# Patient Record
Sex: FEMALE | Race: WHITE | NOT HISPANIC OR LATINO | Employment: UNEMPLOYED | URBAN - METROPOLITAN AREA
[De-identification: names, ages, dates, MRNs, and addresses within clinical notes are randomized per-mention and may not be internally consistent; named-entity substitution may affect disease eponyms.]

---

## 2018-01-13 NOTE — PROCEDURES
Results/Data    Procedure: Electromyogram and Nerve Conduction Study  Indication: Right Upper Extremity   Referred by Dr Vida Ruiz  The procedure's were discussed with the patient  Written consent was obtained prior to the procedure and is detailed in the patient's record  Prior to the start of the procedure a time out was taken and the identity of the patient was confirmed via name and date of birth with the patient  The correct site and the procedure to be performed were confirmed  The correct side was confirmed if applicable  The positioning of the patient was verified  The availability of the correct equipment was verified  Procedure Start Time: 8:30 am    Technique: A sterile concentric needle electrode was used  The patient tolerated the procedure well  There were no complications  Results  : Motor and sensory nerve conduction studies were performed on the median and ulnar nerves  The median motor terminal latency was prolonged with a low compound motor action potential amplitude and a slow conduction velocity across the wrist  The ulnar compound motor action potential was within normal limits  The median F-wave latency was prolonged  The ulnar F wave latency was within normal limits  The median sensory peak latency was prolonged with a normal sensory action potential amplitude  The ulnar sensory action potential was within normal limits  The median palmar evoked response was prolonged by 2 0 ms as compared to the ulnar palmar evoked response at the same distance  Concentric needle examination was performed on various proximal and distal muscles including deltoid, biceps, triceps, pronator teres, APB, FDI and low cervical paraspinals  There was no evidence of active denervation in any of the muscles tested  Moderate decreased recruitment of giant motor units was noted in the APB   The compound motor unit action potentials were of normal configuration with interference patterns being full or full for effort in the remaining muscles tested  Interpretation: There is electrophysiologic evidence of a:  1  Moderate median nerve compression neuropathy at the wrist with demyelinative and axonal changes , consistent with a diagnosis of carpal tunnel syndrome  2  There is no evidence of a cervical radiculopathy or ulnar neuropathy  Clinical correlation is recommended        Signatures   Electronically signed by : Farhana Melendez MD; Feb 1 2016  9:30AM EST                       (Author)

## 2018-07-18 DIAGNOSIS — K21.9 CHRONIC GERD: Primary | ICD-10-CM

## 2018-07-18 NOTE — TELEPHONE ENCOUNTER
Pt hasn't been seen since 6/2/16 would you like to do 1 month supply and have me call them in for an ov visit? Please advise thank you!

## 2018-07-23 ENCOUNTER — TELEPHONE (OUTPATIENT)
Dept: GASTROENTEROLOGY | Facility: CLINIC | Age: 58
End: 2018-07-23

## 2018-07-23 RX ORDER — MOMETASONE FUROATE 50 UG/1
2 SPRAY, METERED NASAL
COMMUNITY

## 2018-07-23 RX ORDER — LEVOTHYROXINE SODIUM 75 UG/1
75 CAPSULE ORAL
COMMUNITY
Start: 2018-06-13

## 2018-07-23 RX ORDER — ANTACID TABLETS 500 MG/1
1 TABLET, CHEWABLE ORAL
COMMUNITY

## 2018-07-23 RX ORDER — ATORVASTATIN CALCIUM 20 MG/1
20 TABLET, FILM COATED ORAL
COMMUNITY
Start: 2018-02-08 | End: 2020-01-15

## 2018-07-23 RX ORDER — ALBUTEROL SULFATE 90 UG/1
1 AEROSOL, METERED RESPIRATORY (INHALATION) EVERY 6 HOURS
COMMUNITY
Start: 2018-02-22 | End: 2019-02-22

## 2018-07-23 RX ORDER — ESOMEPRAZOLE MAGNESIUM 40 MG/1
40 CAPSULE, DELAYED RELEASE ORAL DAILY
Qty: 30 CAPSULE | Refills: 2 | Status: SHIPPED | OUTPATIENT
Start: 2018-07-23 | End: 2018-07-25 | Stop reason: SDUPTHER

## 2018-07-23 RX ORDER — ESOMEPRAZOLE MAGNESIUM 40 MG/1
1 CAPSULE, DELAYED RELEASE ORAL DAILY
COMMUNITY
End: 2018-07-23 | Stop reason: SDUPTHER

## 2018-07-25 ENCOUNTER — OFFICE VISIT (OUTPATIENT)
Dept: GASTROENTEROLOGY | Facility: CLINIC | Age: 58
End: 2018-07-25
Payer: COMMERCIAL

## 2018-07-25 VITALS
SYSTOLIC BLOOD PRESSURE: 128 MMHG | BODY MASS INDEX: 32.95 KG/M2 | HEART RATE: 72 BPM | DIASTOLIC BLOOD PRESSURE: 90 MMHG | WEIGHT: 213.5 LBS

## 2018-07-25 DIAGNOSIS — Z12.11 SCREENING FOR COLON CANCER: ICD-10-CM

## 2018-07-25 DIAGNOSIS — K21.9 CHRONIC GERD: ICD-10-CM

## 2018-07-25 DIAGNOSIS — Z09 FOLLOW UP: Primary | ICD-10-CM

## 2018-07-25 PROBLEM — K22.70 BARRETT'S ESOPHAGUS: Status: ACTIVE | Noted: 2018-07-25

## 2018-07-25 PROCEDURE — 99214 OFFICE O/P EST MOD 30 MIN: CPT | Performed by: PHYSICIAN ASSISTANT

## 2018-07-25 RX ORDER — ESOMEPRAZOLE MAGNESIUM 40 MG/1
40 CAPSULE, DELAYED RELEASE ORAL DAILY
Qty: 90 CAPSULE | Refills: 1 | Status: SHIPPED | OUTPATIENT
Start: 2018-07-25 | End: 2018-12-31 | Stop reason: SDUPTHER

## 2018-07-25 NOTE — PROGRESS NOTES
[facility]  Erick Maggie 66 y o  female MRN: [id number]       CC:  Medication refill, history of Rehman's esophagus    HPI:  Jimmy Livingston is a 51-year-old female with history of GERD and Rehman's esophagus  Patient has chronically been on Nexium 40 mg daily  Patient is here to follow-up as she has not been seen since 2016  Patient reports that she has tried weaning herself off of her PPI, and she is usually interested in holistic remedies such as apple cider vinegar  Patient reports that her reflux is generally controlled on Nexium once a day  She is compliant on a GERD diet  She denies unintentional weight loss, odynophagia, dysphagia, melena or hematochezia  Her last EGD was in 2016, which was normal  Patient reports to me that she had a colonoscopy at age 48  To her knowledge was normal, no history of polyps  Review of Systems:    CONSTITUTIONAL: Denies any fever, chills, or rigors  Good appetite, and no recent weight loss  HEENT: No earache or tinnitus  Denies hearing loss or visual disturbances  CARDIOVASCULAR: No chest pain or palpitations  RESPIRATORY: Denies any cough, hemoptysis, shortness of breath or dyspnea on exertion  GASTROINTESTINAL: As noted in the History of Present Illness  GENITOURINARY: No problems with urination  Denies any hematuria or dysuria  NEUROLOGIC: No dizziness or vertigo, denies headaches  MUSCULOSKELETAL: Denies any muscle or joint pain  SKIN: Denies skin rashes or itching  ENDOCRINE: Denies excessive thirst  Denies intolerance to heat or cold  PSYCHOSOCIAL: Denies depression or anxiety  Denies any recent memory loss         Current Outpatient Prescriptions   Medication Sig Dispense Refill    albuterol (PROVENTIL HFA,VENTOLIN HFA) 90 mcg/act inhaler Inhale 1 puff every 6 (six) hours      atorvastatin (LIPITOR) 20 mg tablet Take 20 mg by mouth      Calcium Carbonate 500 MG CHEW 1 tablet      esomeprazole (NexIUM) 40 MG capsule Take 1 capsule (40 mg total) by mouth daily 90 capsule 1    FERROUS SULFATE DRIED PO 1 tablet      levothyroxine 50 mcg tablet Take 50 mcg by mouth      mometasone (NASONEX) 50 mcg/act nasal spray 2 sprays into each nostril      Cholecalciferol 1000 units capsule 1 tablet       No current facility-administered medications for this visit  Past Medical History:   Diagnosis Date    Diabetes mellitus ([address] )     GERD (gastroesophageal reflux disease)     Hyperlipidemia     Hypothyroidism      Past Surgical History:   Procedure Laterality Date    CHOLECYSTECTOMY      HAND SURGERY      TONSILLECTOMY       Social History     Social History    Marital status: /Civil Union     Spouse name: N/A    Number of children: N/A    Years of education: N/A     Social History Main Topics    Smoking status: Former Smoker    Smokeless tobacco: Never Used    Alcohol use No    Drug use: No    Sexual activity: Not Asked     Other Topics Concern    None     Social History Narrative    None     Family History   Problem Relation Age of Onset    Breast cancer Mother     Hyperlipidemia Mother     Diabetes Father     Hypertension Father             PHYSICAL EXAM:    Vitals:    07/25/18 0942   BP: 128/90   Pulse: 72   Weight: 96 8 kg (213 lb 8 oz)     General Appearance:   Alert and oriented x 3   Cooperative, and in no respiratory distress   HEENT:   Normocephalic, atraumatic, anicteric      Neck:  Supple, symmetrical, trachea midline   Lungs:   Clear to auscultation bilaterally    Heart[de-identified]   Regular rate and rhythm   Abdomen:   Soft, non-tender, non-distended; normal bowel sounds; no masses, no organomegaly    Genitalia:   Deferred    Rectal:   Deferred    Extremities:  No cyanosis, clubbing or edema    Pulses:  2+ and symmetric all extremities    Skin:  Skin color, texture, turgor normal, no rashes or lesions    Lymph nodes:  No palpable cervical or supraclavicular lymphadenopathy          ASSESSMENT and PLAN:      1) Chronic GERD - Patient will continue on PPI indefinitely given her history of Rehman's esophagus  Her symptoms are well controlled on this  She never had DEXA scan done as advise two years ago by Dr Abram Miles  However, patient tells me she is on calcium supplementation   - Recommend vitamin D supplementation  - Follow up with PCP for DEXA    2) History of Rehman's esophagus - Patient's last EGD was in 2016  This was normal   She has 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

## 2018-11-08 ENCOUNTER — OFFICE VISIT (OUTPATIENT)
Dept: OBGYN CLINIC | Facility: CLINIC | Age: 58
End: 2018-11-08
Payer: COMMERCIAL

## 2018-11-08 VITALS
DIASTOLIC BLOOD PRESSURE: 80 MMHG | WEIGHT: 213.2 LBS | BODY MASS INDEX: 33.46 KG/M2 | HEART RATE: 80 BPM | SYSTOLIC BLOOD PRESSURE: 137 MMHG | HEIGHT: 67 IN

## 2018-11-08 DIAGNOSIS — M79.641 RIGHT HAND PAIN: Primary | ICD-10-CM

## 2018-11-08 DIAGNOSIS — R20.0 NUMBNESS AND TINGLING IN RIGHT HAND: ICD-10-CM

## 2018-11-08 DIAGNOSIS — R20.2 NUMBNESS AND TINGLING IN RIGHT HAND: ICD-10-CM

## 2018-11-08 PROCEDURE — 99203 OFFICE O/P NEW LOW 30 MIN: CPT | Performed by: ORTHOPAEDIC SURGERY

## 2018-11-08 NOTE — PROGRESS NOTES
CHIEF COMPLAINT:  Chief Complaint   Patient presents with    Right Wrist - Pain       SUBJECTIVE:  Cyndy Michel is a 66y o  year old RHD retired  female who presents for initial evaluation on our office for right carpal tunnel  An EMG was done on 2-1-16 by Dr Aundrea Siu for a right upper extremity only  She had a right open carpal tunnel release done by Dr Teressa Christianson in July 2017  She has less numbness after surgery  Currently she is not being woken up from sleep  She has been retired from work for 3 months and has not been doing as much with her right hand and her symptoms are slightly better  Today she has no numbness in the right hand  Sometimes she has swelling in the right palm, but this is intermittent with heavy use    The patient has some intermittent mild numbness in the left hand but does NOT want the EMG to be also done for the left side since she "hates having this test done "       PAST MEDICAL HISTORY:  Past Medical History:   Diagnosis Date    Diabetes mellitus ([address] )     GERD (gastroesophageal reflux disease)     Hyperlipidemia     Hypothyroidism        PAST SURGICAL HISTORY:  Past Surgical History:   Procedure Laterality Date    CHOLECYSTECTOMY      HAND SURGERY      TONSILLECTOMY         FAMILY HISTORY:  Family History   Problem Relation Age of Onset    Breast cancer Mother     Hyperlipidemia Mother     Diabetes Father     Hypertension Father        SOCIAL HISTORY:  Social History   Substance Use Topics    Smoking status: Former Smoker    Smokeless tobacco: Never Used    Alcohol use No       MEDICATIONS:    Current Outpatient Prescriptions:     atorvastatin (LIPITOR) 20 mg tablet, Take 20 mg by mouth, Disp: , Rfl:     Calcium Carbonate 500 MG CHEW, 1 tablet, Disp: , Rfl:     Cholecalciferol 1000 units capsule, 1 tablet, Disp: , Rfl:     esomeprazole (NexIUM) 40 MG capsule, Take 1 capsule (40 mg total) by mouth daily, Disp: 90 capsule, Rfl: 1    FERROUS SULFATE DRIED PO, 1 tablet, Disp: , Rfl:     levothyroxine 50 mcg tablet, Take 50 mcg by mouth, Disp: , Rfl:     albuterol (PROVENTIL HFA,VENTOLIN HFA) 90 mcg/act inhaler, Inhale 1 puff every 6 (six) hours, Disp: , Rfl:     mometasone (NASONEX) 50 mcg/act nasal spray, 2 sprays into each nostril, Disp: , Rfl:     ALLERGIES:  Allergies   Allergen Reactions    Shellfish-Derived Products Anaphylaxis    Epinephrine      Other reaction(s): dizzy, near syncope    Lidocaine Dizziness and Syncope     Patient has had syncopal episodes with local anesthesia   Penicillins     Cefprozil Rash    Sulfa Antibiotics Rash       REVIEW OF SYSTEMS:  Review of Systems   Constitutional: Negative for chills, fever and unexpected weight change  HENT: Negative for hearing loss, nosebleeds and sore throat  Eyes: Negative for pain, redness and visual disturbance  Respiratory: Negative for cough, shortness of breath and wheezing  Cardiovascular: Negative for chest pain, palpitations and leg swelling  Gastrointestinal: Negative for abdominal pain, nausea and vomiting  Endocrine: Negative for polydipsia and polyuria  Genitourinary: Negative for dysuria and hematuria  Musculoskeletal: Positive for arthralgias  Negative for joint swelling and myalgias  Skin: Positive for rash and wound  Neurological: Positive for numbness  Negative for dizziness and headaches  Psychiatric/Behavioral: Negative for decreased concentration and suicidal ideas  The patient is not nervous/anxious          VITALS:  Vitals:    11/08/18 1042   BP: 137/80   Pulse: 80       LABS:  HgA1c: No results found for: HGBA1C  BMP: No results found for: GLUCOSE, CALCIUM, NA, K, CO2, CL, BUN, CREATININE    _____________________________________________________  PHYSICAL EXAMINATION:  General: well developed and well nourished, alert, oriented times 3 and appears comfortable  Psychiatric: Normal  HEENT: Trachea Midline, No torticollis  Pulmonary: No audible wheezing or respiratory distress   Skin: No masses, erthema, lacerations, fluctation, ulcerations  Neurovascular: Sensation Intact to the Median, Ulnar, Radial Nerve, Motor Intact to the Median, Ulnar, Radial Nerve and Pulses Intact    MUSCULOSKELETAL EXAMINATION:  Right Carpal Tunnel Exam:  Right incision in the palm is well healed  No swelling  Nontender to palpation  Negative thenar atrophy  Negative phalen's test  Negative carpal tunnel compression  Negative tinels over median nerve at the wrist   Opposition strength 5/5  Abduction strength 5/5     2 point discrimination is 4 mm throughout       ___________________________________________________  STUDIES REVIEWED:  EMG done on 2-1-16 of the RUE only showed moderate carpal tunnel with demyelinative and axonal changes  There was no evidence of cervical radiculopathy or ulnar neuropathy  EMG was done BEFORE she had surgery for the carpal tunnel    PROCEDURES PERFORMED:  Procedures  No Procedures performed today    _____________________________________________________  ASSESSMENT/PLAN:    Right carpal tunnel s/p open carpal tunnel release  * Wear splint at night because it has been helping  * We will order a new EMG of the RUE for further evaluation  We will try to schedule the patient at the same place she had the last study done  The patient has some intermittent mild numbness in the left hand but does NOT want the EMG to be also done for the left side since she "hates having this test done "   * No revision surgery is needed at this time  * MRI of the right wrist will be ordered to ensure a complete carpal tunnel release was done  Follow Up:  Return for f/u after EMG and MRI      To Do Next Visit:  Re-evaluation of current issue      Scribe Attestation    I,:   Crispin Patterson PA-C am acting as a scribe while in the presence of the attending physician :        I,:   Meg Vann MD personally performed the services described in this documentation    as scribed in my presence :

## 2018-11-27 ENCOUNTER — HOSPITAL ENCOUNTER (OUTPATIENT)
Dept: MRI IMAGING | Facility: CLINIC | Age: 58
Discharge: HOME | End: 2018-11-27

## 2018-11-27 DIAGNOSIS — R20.2 NUMBNESS AND TINGLING IN RIGHT HAND: ICD-10-CM

## 2018-11-27 DIAGNOSIS — M79.641 RIGHT HAND PAIN: ICD-10-CM

## 2018-11-27 DIAGNOSIS — R20.0 NUMBNESS AND TINGLING IN RIGHT HAND: ICD-10-CM

## 2018-12-12 ENCOUNTER — TELEPHONE (OUTPATIENT)
Dept: OBGYN CLINIC | Facility: CLINIC | Age: 58
End: 2018-12-12

## 2018-12-12 NOTE — TELEPHONE ENCOUNTER
I spoke to Dr Felipe Oconnor  Last note was reviewed    The MRI authorization is good for today and tomorrow  We can not get another suthorization until this one   However, getting it approved again may be difficult  For nerve entrapment, the patient needs positive EMG findings first  The patient's EMG is still pending (appt on 2018)  For persistent pain, she would need to complete 6 weeks with therapy  The patient has not completed therapy yet  Please call the patient and see if the MRI can be done today or tomorrow  Otherwise, we will need to await the EMG results and/or have the patient try hand therapy first      She is scheduled to f/u with Dr Nina Yee on  and should keep that appointment (as long as she will have completed the EMG)           ----- Message from Miguel Belle PA-C sent at 2018 11:25 AM EST -----  I called today and I am waiting for a call back  ----- Message -----  From: John Garay  Sent: 2018  11:13 AM  To: JANETTE Randolph,    Peer to peer is need for patients MRI the original auth for this    Please call [phone], member ID # [id number]    Thank you,

## 2018-12-13 ENCOUNTER — TELEPHONE (OUTPATIENT)
Dept: OBGYN CLINIC | Facility: CLINIC | Age: 58
End: 2018-12-13

## 2018-12-13 NOTE — TELEPHONE ENCOUNTER
I tried to call the patient back  No answer  I left a message  We will look over the EMG, which she has scheduled soon  We would like the patient to request the previous therapy records  This would help me to get the MRI approved from her insurance company  I left these details in the message

## 2018-12-13 NOTE — TELEPHONE ENCOUNTER
I spoke to the patient  She is getting the therapy records for us and get the EMG  She wants to wait for the EMG results and then will re-consider doing hand therapy again  Please call her to re-schedule her f/u appt for after the EMG

## 2018-12-17 ENCOUNTER — PROCEDURE VISIT (OUTPATIENT)
Dept: NEUROLOGY | Facility: CLINIC | Age: 58
End: 2018-12-17
Payer: COMMERCIAL

## 2018-12-17 DIAGNOSIS — M79.641 RIGHT HAND PAIN: ICD-10-CM

## 2018-12-17 PROCEDURE — 95886 MUSC TEST DONE W/N TEST COMP: CPT | Performed by: PHYSICAL MEDICINE & REHABILITATION

## 2018-12-17 PROCEDURE — 95908 NRV CNDJ TST 3-4 STUDIES: CPT | Performed by: PHYSICAL MEDICINE & REHABILITATION

## 2018-12-17 NOTE — PROGRESS NOTES
The procedure was discussed with the patient  Verbal consent was obtained after discussing risks and benefits  A sterile concentric needle electrode was used  The patient tolerated the procedure well  There were no complications  EMG RIGHT UPPER EXTREMITY    Motor and sensory conduction studies were performed on the right median and ulnar nerves  The median motor terminal latency was prolonged at 4 6 milliseconds with a normal compound motor action potential amplitude and slowed conduction velocity of 47 m/sec across the wrist   The ulnar compound motor action potential was normal     Median and ulnar F waves were normal     The median sensory peak latency was prolonged at 4 2 millisecond with a normal sensory action potential amplitude  The ulnar sensory action potential was normal     Concentric needle EMG was performed in various distal and proximal muscles of the right upper extremity including APB, FDI, pronator teres, deltoid, biceps, triceps and low cervical paraspinal region  There was no evidence of active denervation in any of the muscles tested  Mild decreased recruitment of giant motor units was noted in the abductor pollicis brevis  The compound motor unit action potentials were of normal configuration with interference patterns being full or full for effort in the remaining muscles tested  IMPRESSION:  There is electrophysiologic evidence of a:    1  Moderate median nerve compression neuropathy at the wrist with demyelinative changes consistent with a diagnosis of carpal tunnel syndrome  2  There is no evidence of a cervical radiculopathy or ulnar neuropathy  Clinical correlation is  recommended  JUSTINA Miguel Side

## 2018-12-20 ENCOUNTER — OFFICE VISIT (OUTPATIENT)
Dept: OBGYN CLINIC | Facility: CLINIC | Age: 58
End: 2018-12-20
Payer: COMMERCIAL

## 2018-12-20 VITALS — WEIGHT: 213 LBS | RESPIRATION RATE: 20 BRPM | BODY MASS INDEX: 33.43 KG/M2 | HEIGHT: 67 IN

## 2018-12-20 DIAGNOSIS — R20.2 NUMBNESS AND TINGLING IN RIGHT HAND: Primary | ICD-10-CM

## 2018-12-20 DIAGNOSIS — R20.0 NUMBNESS AND TINGLING IN RIGHT HAND: Primary | ICD-10-CM

## 2018-12-20 DIAGNOSIS — M79.641 RIGHT HAND PAIN: ICD-10-CM

## 2018-12-20 PROCEDURE — 99213 OFFICE O/P EST LOW 20 MIN: CPT | Performed by: ORTHOPAEDIC SURGERY

## 2018-12-20 NOTE — PROGRESS NOTES
CHIEF COMPLAINT:  Chief Complaint   Patient presents with    Right Wrist - Follow-up       SUBJECTIVE:  Jodi Williamson is a 66y o  year old RHD female who presents for follow up to right hand numbness  She denies nighttime disturbances  At one point after her carpal tunnel release in 2017, she noticed a slight improvement in her right hand symptoms after surgery  She has numbness/tingling in the right hand when driving, using the computer, and using her hand  If she does not wear the brace at night, the numbness will wake her up  She wears the brace on her right hand at night on a consistent basis  We ordered an MRI to evaluate for a complete release, but the patient was out of town and could not get it done while it was authorized  My Physician Assistant tried to get it authorized for additional days, but we do not have documentation that the patient has failed OT  The patient states that she did try therapy for 2 visits in 2017 and this was stopped due to an increase in her symptoms  The patient has requested those records, but our office we have not received them yet  The patient has been on vacation for 2 weeks and has not used her hand, and she feels that has improved her symptoms          PAST MEDICAL HISTORY:  Past Medical History:   Diagnosis Date    Diabetes mellitus ([address] )     GERD (gastroesophageal reflux disease)     Hyperlipidemia     Hypothyroidism        PAST SURGICAL HISTORY:  Past Surgical History:   Procedure Laterality Date    CHOLECYSTECTOMY      HAND SURGERY      TONSILLECTOMY         FAMILY HISTORY:  Family History   Problem Relation Age of Onset    Breast cancer Mother     Hyperlipidemia Mother     Diabetes Father     Hypertension Father        SOCIAL HISTORY:  Social History   Substance Use Topics    Smoking status: Former Smoker    Smokeless tobacco: Never Used    Alcohol use No       MEDICATIONS:    Current Outpatient Prescriptions:     albuterol (PROVENTIL HFA,VENTOLIN HFA) 90 mcg/act inhaler, Inhale 1 puff every 6 (six) hours, Disp: , Rfl:     atorvastatin (LIPITOR) 20 mg tablet, Take 20 mg by mouth, Disp: , Rfl:     Calcium Carbonate 500 MG CHEW, 1 tablet, Disp: , Rfl:     Cholecalciferol 1000 units capsule, 1 tablet, Disp: , Rfl:     esomeprazole (NexIUM) 40 MG capsule, Take 1 capsule (40 mg total) by mouth daily, Disp: 90 capsule, Rfl: 1    FERROUS SULFATE DRIED PO, 1 tablet, Disp: , Rfl:     levothyroxine 50 mcg tablet, Take 50 mcg by mouth, Disp: , Rfl:     mometasone (NASONEX) 50 mcg/act nasal spray, 2 sprays into each nostril, Disp: , Rfl:     ALLERGIES:  Allergies   Allergen Reactions    Other Anaphylaxis     All berries causes throat swelling    Shellfish-Derived Products Anaphylaxis    Epinephrine      Other reaction(s): dizzy, near syncope    Lidocaine Dizziness and Syncope     Patient has had syncopal episodes with local anesthesia   Penicillins     Cefprozil Rash    Sulfa Antibiotics Rash       REVIEW OF SYSTEMS:  Review of Systems   Constitutional: Negative for chills, fever and unexpected weight change  HENT: Negative for hearing loss, nosebleeds and sore throat  Eyes: Negative for pain, redness and visual disturbance  Respiratory: Negative for cough, shortness of breath and wheezing  Cardiovascular: Negative for chest pain, palpitations and leg swelling  Gastrointestinal: Negative for abdominal pain, nausea and vomiting  Endocrine: Negative for polydipsia and polyuria  Genitourinary: Negative for dysuria and hematuria  Musculoskeletal: Negative for arthralgias, joint swelling and myalgias  Skin: Negative for rash and wound  Neurological: Positive for numbness  Negative for dizziness and headaches  Psychiatric/Behavioral: Negative for decreased concentration and suicidal ideas  The patient is not nervous/anxious          VITALS:  Vitals:    12/20/18 0907   Resp: 20       LABS:  HgA1c: No results found for: HGBA1C  BMP: No results found for: GLUCOSE, CALCIUM, NA, K, CO2, CL, BUN, CREATININE    _____________________________________________________  PHYSICAL EXAMINATION:  General: well developed and well nourished, alert, oriented times 3 and appears comfortable  Psychiatric: Normal  HEENT: Trachea Midline, No torticollis  Pulmonary: No audible wheezing or respiratory distress   Skin: No masses, erthema, lacerations, fluctation, ulcerations  Neurovascular: Motor Intact to the Median, Ulnar, Radial Nerve and Pulses Intact    MUSCULOSKELETAL EXAMINATION:  Right Carpal Tunnel Exam:  Negative thenar atrophy  Positive phalen's test  Negative carpal tunnel compression  Negative tinels over median nerve at the wrist   Opposition strength 5/5  Abduction strength 5/5     _________________________________________________  STUDIES REVIEWED:  EMG obtained on [facility] on 12/17/2018 by Dr Reza Anaya showed moderate median nerve compression without evidence of cervical radiculopathy or ulnar neuropathy  EMG done by the same provider on 2/1/2016 showed Moderate median nerve compression neuropathy at the wrist with demyelinative and axonal changes , consistent with a diagnosis of carpal tunnel syndrome  There is no evidence of a cervical radiculopathy or ulnar neuropathy  PROCEDURES PERFORMED:  Procedures  No Procedures performed today    _____________________________________________________  ASSESSMENT/PLAN:    Right moderate carpal tunnel s/p open carpal tunnel release done by another surgeon last year   * Risks of surgery is minimal   We will need to dissect through the scar tissue  She has a burning sensation in the palm of her hand , but on exam today a negative tinnels  There is a good chance that the surgery will help improve her symptoms   * MRI right wrist:  we will re-order  If this is positive for an incomplete release, we will likely discuss a revision open carpal tunnel release further    Surgery was discussed today but the patient wants to obtain the MRI first     * The patient states she has dizziness and issues with local anesthesia, both lidocaine plain and lidocaine with epi  She will discuss with her dentist to see what anesthesia they have used that the patient has tolerated  * The patient will f/u on obtaining her records for failed OT last year  She requested them recently  She attended 2 visits and therapy was stopped due to an increase in the patient's symptoms  Follow Up:  F/u after MRI to discuss results and possible revision right open carpal tunnel release    To Do Next Visit:  Re-evaluation of current issue  Go over MRI results  General Discussions:  Carpal Tunnel Syndrome: The anatomy and physiology of carpal tunnel syndrome was discussed with the patient today  Increase pressure localized under the transverse carpal ligament can cause pain, numbness, tingling, or dysesthesias within the median nerve distribution as well as feelings of fatigue, clumsiness, or awkwardness  These symptoms typically occur at night and worse in the morning upon waking  Eventually, untreated carpal tunnel syndrome can result in weakness and permanent loss of muscle within the thenar compartment of the hand  Treatment options were discussed with the patient  Conservative treatment includes nocturnal resting splints to keep the nerve in a neutral position, ergonomic changes within the work or home environment, activity modification, and tendon gliding exercises  Vitamin B6 one tablet daily over the counter may helpful to reduce symptoms  Steroid injections within the carpal canal can help a majority of patients, however this is often self-limited in a majority of patients  Surgical intervention to divide the transverse carpal ligament typically results in a long-lasting relief of the patient's complaints, with the recurrence rate of less than 1%  Scribe Attestation    I,:    am acting as a scribe while in the presence of the attending physician :        I,:    personally performed the services described in this documentation    as scribed in my presence :

## 2018-12-27 NOTE — TELEPHONE ENCOUNTER
This is a peer to peer again, even after stating the positive EMG findings I think I sent a message about this to call to get approved   The number is [phone] and the members ID # is [id number]

## 2018-12-28 NOTE — TELEPHONE ENCOUNTER
I called today for authorization and spoke to Dr Negar Fitzpatrick  The patient's MRI was authorized   [id number]    It I valid until jan 26 2019    I called the patient and left a message letting her know it was approved  Please try calling her back to let her know that it was approved    She should be all set for her MRI on Sunday,    thank you

## 2018-12-28 NOTE — TELEPHONE ENCOUNTER
I called and the insurance company does not see a new request for authorization  I can see my order placed in EPIC on 12/20/2018  I was asked by the insurance company to have you call them back and speak to the intake staff about the new MRI order placed on 12/20/2018  They do not see any new request that was submitted after the 12/20/2018 order  They need this first before I do a peer to peer  Thank you

## 2018-12-28 NOTE — TELEPHONE ENCOUNTER
I did call the same day the order was placed to start a new request, I think the case closed  I will call now to open a new case for an authorization for the MRI, I will let you know when this is done

## 2018-12-28 NOTE — TELEPHONE ENCOUNTER
John Bass is scheduled for an MRI on Sunday and needs to know if she has been approved by her insurance  Please call her @[phone]    Thank you

## 2018-12-30 ENCOUNTER — HOSPITAL ENCOUNTER (OUTPATIENT)
Dept: MRI IMAGING | Facility: HOSPITAL | Age: 58
Discharge: HOME | End: 2018-12-30
Payer: COMMERCIAL

## 2018-12-30 DIAGNOSIS — R20.2 NUMBNESS AND TINGLING IN RIGHT HAND: ICD-10-CM

## 2018-12-30 DIAGNOSIS — R20.0 NUMBNESS AND TINGLING IN RIGHT HAND: ICD-10-CM

## 2018-12-30 DIAGNOSIS — M79.641 RIGHT HAND PAIN: ICD-10-CM

## 2018-12-30 PROCEDURE — 73221 MRI JOINT UPR EXTREM W/O DYE: CPT

## 2018-12-31 DIAGNOSIS — K21.9 CHRONIC GERD: ICD-10-CM

## 2018-12-31 RX ORDER — ESOMEPRAZOLE MAGNESIUM 40 MG/1
40 CAPSULE, DELAYED RELEASE ORAL DAILY
Qty: 90 CAPSULE | Refills: 0 | Status: SHIPPED | OUTPATIENT
Start: 2018-12-31 | End: 2019-01-08 | Stop reason: SDUPTHER

## 2018-12-31 RX ORDER — ESOMEPRAZOLE MAGNESIUM 40 MG/1
40 CAPSULE, DELAYED RELEASE ORAL DAILY
Qty: 90 CAPSULE | Refills: 2 | Status: SHIPPED | OUTPATIENT
Start: 2018-12-31 | End: 2018-12-31 | Stop reason: SDUPTHER

## 2019-01-07 DIAGNOSIS — K21.9 CHRONIC GERD: ICD-10-CM

## 2019-01-08 RX ORDER — ESOMEPRAZOLE MAGNESIUM 40 MG/1
40 CAPSULE, DELAYED RELEASE ORAL DAILY
Qty: 90 CAPSULE | Refills: 3 | Status: SHIPPED | OUTPATIENT
Start: 2019-01-08 | End: 2019-12-10

## 2019-01-08 NOTE — TELEPHONE ENCOUNTER
Called and spoke to patient she was upset with having no refills on this medication relayed that we will provide additional refills to her medication esomeprazole

## 2019-01-30 ENCOUNTER — OFFICE VISIT (OUTPATIENT)
Dept: OBGYN CLINIC | Facility: CLINIC | Age: 59
End: 2019-01-30
Payer: COMMERCIAL

## 2019-01-30 VITALS
HEART RATE: 82 BPM | SYSTOLIC BLOOD PRESSURE: 142 MMHG | DIASTOLIC BLOOD PRESSURE: 83 MMHG | HEIGHT: 67 IN | BODY MASS INDEX: 32.96 KG/M2 | WEIGHT: 210 LBS

## 2019-01-30 DIAGNOSIS — G56.01 CARPAL TUNNEL SYNDROME OF RIGHT WRIST: Primary | ICD-10-CM

## 2019-01-30 PROCEDURE — 99213 OFFICE O/P EST LOW 20 MIN: CPT | Performed by: ORTHOPAEDIC SURGERY

## 2019-01-30 NOTE — PROGRESS NOTES
CHIEF COMPLAINT:  Chief Complaint   Patient presents with    Right Wrist - Follow-up       SUBJECTIVE:  Ekaterina Gold is a 66y o  year old RHD female who presents to the office for review of MRI of the right wrist   MRI was ordered to evaluate for incomplete release of the right carpal tunnel  Patient had carpal tunnel release performed by another physician in 2017  After release she noted slight improvement her right hand symptoms after surgery  Patient has had continue numbness and tingling after surgery that increases when she is driving using the computer and using her hand  Today patient states that she has had left numbness and tingling for the last month and a half  Patient notes that she has had decrease in symptoms since patient was on vacation        PAST MEDICAL HISTORY:  Past Medical History:   Diagnosis Date    Diabetes mellitus ([address] )     GERD (gastroesophageal reflux disease)     Hyperlipidemia     Hypothyroidism        PAST SURGICAL HISTORY:  Past Surgical History:   Procedure Laterality Date    CHOLECYSTECTOMY      HAND SURGERY      TONSILLECTOMY         FAMILY HISTORY:  Family History   Problem Relation Age of Onset    Breast cancer Mother     Hyperlipidemia Mother     Diabetes Father     Hypertension Father        SOCIAL HISTORY:  Social History   Substance Use Topics    Smoking status: Former Smoker    Smokeless tobacco: Never Used    Alcohol use No       MEDICATIONS:    Current Outpatient Prescriptions:     albuterol (PROVENTIL HFA,VENTOLIN HFA) 90 mcg/act inhaler, Inhale 1 puff every 6 (six) hours, Disp: , Rfl:     atorvastatin (LIPITOR) 20 mg tablet, Take 20 mg by mouth, Disp: , Rfl:     Calcium Carbonate 500 MG CHEW, 1 tablet, Disp: , Rfl:     Cholecalciferol 1000 units capsule, 1 tablet, Disp: , Rfl:     esomeprazole (NexIUM) 40 MG capsule, Take 1 capsule (40 mg total) by mouth daily for 360 days, Disp: 90 capsule, Rfl: 3    FERROUS SULFATE DRIED PO, 1 tablet, Disp: , Rfl:     levothyroxine 50 mcg tablet, Take 50 mcg by mouth, Disp: , Rfl:     mometasone (NASONEX) 50 mcg/act nasal spray, 2 sprays into each nostril, Disp: , Rfl:     ALLERGIES:  Allergies   Allergen Reactions    Other Anaphylaxis     All berries causes throat swelling    Shellfish-Derived Products Anaphylaxis    Epinephrine      Other reaction(s): dizzy, near syncope    Lidocaine Dizziness and Syncope     Patient has had syncopal episodes with local anesthesia   Penicillins     Cefprozil Rash    Sulfa Antibiotics Rash       REVIEW OF SYSTEMS:  Review of Systems   Constitutional: Negative for chills, fever and unexpected weight change  HENT: Negative for hearing loss, nosebleeds and sore throat  Eyes: Negative for pain, redness and visual disturbance  Respiratory: Negative for cough, shortness of breath and wheezing  Cardiovascular: Negative for chest pain, palpitations and leg swelling  Gastrointestinal: Negative for abdominal pain, nausea and vomiting  Endocrine: Negative for polydipsia and polyuria  Genitourinary: Negative for dysuria and hematuria  Musculoskeletal: Negative for arthralgias, joint swelling and myalgias  Skin: Negative for rash and wound  Neurological: Negative for dizziness, numbness and headaches  Psychiatric/Behavioral: Negative for decreased concentration, dysphoric mood and suicidal ideas  The patient is not nervous/anxious          VITALS:  Vitals:    01/30/19 0920   BP: 142/83   Pulse: 82       LABS:  HgA1c: No results found for: HGBA1C  BMP: No results found for: GLUCOSE, CALCIUM, NA, K, CO2, CL, BUN, CREATININE    _____________________________________________________  PHYSICAL EXAMINATION:  General: well developed and well nourished, alert, oriented times 3 and appears comfortable  Psychiatric: Normal  HEENT: Trachea Midline, No torticollis  Pulmonary: No audible wheezing or respiratory distress   Skin: No masses, erthema, lacerations, fluctation, ulcerations  Neurovascular: Sensation intact to the Ulnar Nerve, Sensation Intact to the Radial Nerve, Motor Intact to the Median, Ulnar, Radial Nerve and Pulses Intact    MUSCULOSKELETAL EXAMINATION:  Right Carpal Tunnel Exam:  Negative thenar atrophy  Positive phalen's test  Negative carpal tunnel compression  Negative tinels over median nerve at the wrist   Opposition strength 5/5  Abduction strength 5/5    ___________________________________________________  STUDIES REVIEWED:  Review of right wrist MRI without contrast performed 12/30/2018 shows intact carpal tunnel, mild carpal chondromalacia throughout the wrist, mild separation of flexor tendons within the carpal tunnel as well as slight bowing and thickening of the flexor retinaculum      PROCEDURES PERFORMED:  Procedures  No Procedures performed today    _____________________________________________________  ASSESSMENT/PLAN:    Right carpal tunnel syndrome  * due to patient's decrease in symptoms for the last month and a half patient would like to conservatively monitor any progression or return of symptoms  * patient will call the office if she feels she needs re-evaluation and or to discuss right carpal tunnel release          Follow Up:  Return if symptoms worsen or fail to improve  To Do Next Visit:  Re-evaluation of current issue    General Discussions:  Carpal Tunnel Syndrome: The anatomy and physiology of carpal tunnel syndrome was discussed with the patient today  Increase pressure localized under the transverse carpal ligament can cause pain, numbness, tingling, or dysesthesias within the median nerve distribution as well as feelings of fatigue, clumsiness, or awkwardness  These symptoms typically occur at night and worse in the morning upon waking  Eventually, untreated carpal tunnel syndrome can result in weakness and permanent loss of muscle within the thenar compartment of the hand  Treatment options were discussed with the patient  Conservative treatment includes nocturnal resting splints to keep the nerve in a neutral position, ergonomic changes within the work or home environment, activity modification, and tendon gliding exercises  Vitamin B6 one tablet daily over the counter may helpful to reduce symptoms  Steroid injections within the carpal canal can help a majority of patients, however this is often self-limited in a majority of patients  Surgical intervention to divide the transverse carpal ligament typically results in a long-lasting relief of the patient's complaints, with the recurrence rate of less than 1%                                                                                                                                                                                     Scribe Attestation    I,:   Lluvia Rubio am acting as a scribe while in the presence of the attending physician :        I,:   Afshin Og MD personally performed the services described in this documentation    as scribed in my presence :

## 2019-12-10 ENCOUNTER — TELEPHONE (OUTPATIENT)
Dept: GASTROENTEROLOGY | Facility: CLINIC | Age: 59
End: 2019-12-10

## 2019-12-10 ENCOUNTER — OFFICE VISIT (OUTPATIENT)
Dept: GASTROENTEROLOGY | Facility: CLINIC | Age: 59
End: 2019-12-10
Payer: COMMERCIAL

## 2019-12-10 VITALS
SYSTOLIC BLOOD PRESSURE: 146 MMHG | HEART RATE: 72 BPM | WEIGHT: 212.38 LBS | BODY MASS INDEX: 33.26 KG/M2 | DIASTOLIC BLOOD PRESSURE: 88 MMHG

## 2019-12-10 DIAGNOSIS — Z12.11 COLON CANCER SCREENING: ICD-10-CM

## 2019-12-10 DIAGNOSIS — K21.9 CHRONIC GERD: ICD-10-CM

## 2019-12-10 DIAGNOSIS — K22.70 BARRETT'S ESOPHAGUS WITHOUT DYSPLASIA: Primary | ICD-10-CM

## 2019-12-10 PROCEDURE — 99214 OFFICE O/P EST MOD 30 MIN: CPT | Performed by: INTERNAL MEDICINE

## 2019-12-10 RX ORDER — OMEPRAZOLE 20 MG/1
20 CAPSULE, DELAYED RELEASE ORAL 2 TIMES DAILY
COMMUNITY
End: 2020-01-15 | Stop reason: ALTCHOICE

## 2019-12-10 NOTE — PROGRESS NOTES
Follow-up Note -  [facility]  Marlena Huang 1960 64 y o  female     ASSESSMENT @ PLAN:   She is a 56-year-old female with gastroesophageal reflux disease Rehman's esophagus with negative Rehman's biopsies in 2016 here for follow-up  She had worsening of her reflux dysphagia and dyspepsia recently and went to omeprazole b i d  and has had a good response  1 will do EGD to investigate will biopsy for Rehman's esophagus  I told her I want her to have 2 negative Rehman's biopsies to be deemed a week regular reflux patient    2 she will continue on her omeprazole 20 milligram b i d  for 30 days and then will go down to once a day    3 she had a negative colonoscopy 7 years ago and wants to have a stool colo guard and we will order this; she will be due for colon recall in 3 years    Reason:   GERD and Rehman's    HPI:   She is a 56-year-old female with gastroesophageal reflux disease and Rehman's esophagus with no Rehman's seen in 2016 on endoscopy  She is here to schedule her follow-up  Recently she had much stress and was eating poorly and had worsening of her reflux with dyspepsia and solid food dysphagia  She went upper number omeprazole to b i d  dosing for the last 3 weeks and has had a complete response  She denies heartburn regurgitation melena hematochezia or unintended weight loss or excessive NSAID use  Last endoscopy was in 2016 and she had no visualized Rehman's  In addition she had a colonoscopy done 7 years ago that was negative she is worried about waiting 10 years she wants to have a stool colo guard and we did talk about this at length  We had also talked about the chronic risk of PPIs  REVIEW OF SYSTEMS:     CONSTITUTIONAL: Denies any fever, chills, or rigors  Good appetite, and no recent weight loss  HEENT: No earache or tinnitus  Denies hearing loss or visual disturbances  CARDIOVASCULAR: No chest pain or palpitations     RESPIRATORY: Denies any cough, hemoptysis, shortness of breath or dyspnea on exertion  GASTROINTESTINAL: As noted in the History of Present Illness  GENITOURINARY: No problems with urination  Denies any hematuria or dysuria  NEUROLOGIC: No dizziness or vertigo, denies headaches  MUSCULOSKELETAL: Denies any muscle or joint pain  SKIN: Denies skin rashes or itching  ENDOCRINE: Denies excessive thirst  Denies intolerance to heat or cold  PSYCHOSOCIAL: Denies depression or anxiety  Denies any recent memory loss       Past Medical History:   Diagnosis Date    [facility]     GERD (gastroesophageal reflux disease)     Hyperlipidemia     Hypothyroidism       Past Surgical History:   Procedure Laterality Date    CHOLECYSTECTOMY      HAND SURGERY      TONSILLECTOMY       Social History     Socioeconomic History    Marital status: /Civil Union     Spouse name: Not on file    Number of children: Not on file    Years of education: Not on file    Highest education level: Not on file   Occupational History    Not on file   Social Needs    Financial resource strain: Not on file    Food insecurity:     Worry: Not on file     Inability: Not on file    Transportation needs:     Medical: Not on file     Non-medical: Not on file   Tobacco Use    Smoking status: Former Smoker    Smokeless tobacco: Never Used   Substance and Sexual Activity    Alcohol use: No    Drug use: No    Sexual activity: Yes     Partners: Male   Lifestyle    Physical activity:     Days per week: Not on file     Minutes per session: Not on file    Stress: Not on file   Relationships    Social connections:     Talks on phone: Not on file     Gets together: Not on file     Attends Denominational service: Not on file     Active member of club or organization: Not on file     Attends meetings of clubs or organizations: Not on file     Relationship status: Not on file    Intimate partner violence:     Fear of current or ex partner: Not on file Emotionally abused: Not on file     Physically abused: Not on file     Forced sexual activity: Not on file   Other Topics Concern    Not on file   Social History Narrative    Not on file     Family History   Problem Relation Age of Onset    Breast cancer Mother     Hyperlipidemia Mother     Diabetes Father     Hypertension Father      Other; Shellfish-derived products; Epinephrine; Lidocaine; Sodium chloride; Cefprozil; and Sulfa antibiotics  Current Outpatient Medications   Medication Sig Dispense Refill    Calcium Carbonate 500 MG CHEW 1 tablet      Cholecalciferol 1000 units capsule 1 tablet      levothyroxine 50 mcg tablet Take 50 mcg by mouth      omeprazole (PriLOSEC) 20 mg delayed release capsule Take 20 mg by mouth 2 (two) times a day      atorvastatin (LIPITOR) 20 mg tablet Take 20 mg by mouth      FERROUS SULFATE DRIED PO 1 tablet      mometasone (NASONEX) 50 mcg/act nasal spray 2 sprays into each nostril       No current facility-administered medications for this visit  Blood pressure 146/88, pulse 72, weight 96 3 kg (212 lb 6 oz)  PHYSICAL EXAM:     General Appearance:   Alert, cooperative, no distress, appears stated age    HEENT:   Normocephalic, atraumatic, anicteric      Neck:  Supple, symmetrical, trachea midline, no adenopathy;    thyroid: no enlargement/tenderness/nodules; no carotid  bruit or JVD    Lungs:   Clear to auscultation bilaterally; no rales, rhonchi or wheezing; respirations unlabored    Heart[de-identified]   S1 and S2 normal; regular rate and rhythm; no murmur, rub, or gallop     Abdomen:   Soft, non-tender, non-distended; normal bowel sounds; no masses, no organomegaly    Genitalia:   Deferred    Rectal:   Deferred    Extremities:  No cyanosis, clubbing or edema    Pulses:  2+ and symmetric all extremities    Skin:  Skin color, texture, turgor normal, no rashes or lesions    Lymph nodes:  No palpable cervical, axillary or inguinal lymphadenopathy        No results found for: WBC, HGB, HCT, MCV, PLT  No results found for: GLUCOSE, CALCIUM, NA, K, CO2, CL, BUN, CREATININE  No results found for: ALT, AST, GGT, ALKPHOS, BILITOT  No results found for: INR, PROTIME

## 2020-01-13 ENCOUNTER — TELEPHONE (OUTPATIENT)
Dept: GASTROENTEROLOGY | Facility: CLINIC | Age: 60
End: 2020-01-13

## 2020-01-13 NOTE — TELEPHONE ENCOUNTER
Spoke with patient she states she has never been on Omeprazole she has been taking Nexium  I advised patient according to Dr Manny Guerin last note it was recommended omeprazole 20mg twice daily for 30 days and thereafter she should take daily  I just needed to confirm so we can send the correct medication to her pharmacy  She will discuss with with Dr Melanie Davenport after her upcoming EGD which PPI she should be taking she does not want any medication refilled at this time

## 2020-01-13 NOTE — TELEPHONE ENCOUNTER
----- Message from Neel Mi sent at 1/13/2020 10:09 AM EST -----  Regarding: Prescription Question  Contact: [phone]  Hi Dr Villa,  I see in my [facility] on line chart that it states one of my medications as Omeprazole 20mg  I have never taken this drug and is wrongly stated in my chart  I take the generic to Nexium called Esomeprazole Mag Dr Valerie Christensen 40mg  Can you get that corrected so I can order my refills on line? Thank you! See you Wednesday this week for my Endoscopy    Sincerely,  Leighton Keys

## 2020-01-14 ENCOUNTER — ANESTHESIA EVENT (OUTPATIENT)
Dept: GASTROENTEROLOGY | Facility: HOSPITAL | Age: 60
End: 2020-01-14

## 2020-01-15 ENCOUNTER — ANESTHESIA (OUTPATIENT)
Dept: GASTROENTEROLOGY | Facility: HOSPITAL | Age: 60
End: 2020-01-15

## 2020-01-15 ENCOUNTER — HOSPITAL ENCOUNTER (OUTPATIENT)
Dept: GASTROENTEROLOGY | Facility: HOSPITAL | Age: 60
Setting detail: OUTPATIENT SURGERY
Discharge: HOME | End: 2020-01-15
Attending: INTERNAL MEDICINE | Admitting: INTERNAL MEDICINE
Payer: COMMERCIAL

## 2020-01-15 VITALS
OXYGEN SATURATION: 98 % | TEMPERATURE: 97.4 F | HEIGHT: 67 IN | RESPIRATION RATE: 18 BRPM | SYSTOLIC BLOOD PRESSURE: 122 MMHG | BODY MASS INDEX: 33.43 KG/M2 | WEIGHT: 212.96 LBS | DIASTOLIC BLOOD PRESSURE: 74 MMHG | HEART RATE: 77 BPM

## 2020-01-15 DIAGNOSIS — K21.9 CHRONIC GERD: ICD-10-CM

## 2020-01-15 DIAGNOSIS — K22.70 BARRETT'S ESOPHAGUS WITHOUT DYSPLASIA: ICD-10-CM

## 2020-01-15 PROCEDURE — 43239 EGD BIOPSY SINGLE/MULTIPLE: CPT | Performed by: INTERNAL MEDICINE

## 2020-01-15 PROCEDURE — 88342 IMHCHEM/IMCYTCHM 1ST ANTB: CPT | Performed by: PATHOLOGY

## 2020-01-15 PROCEDURE — 88305 TISSUE EXAM BY PATHOLOGIST: CPT | Performed by: PATHOLOGY

## 2020-01-15 RX ORDER — ESOMEPRAZOLE MAGNESIUM 40 MG/1
40 CAPSULE, DELAYED RELEASE ORAL
COMMUNITY
End: 2020-01-15 | Stop reason: SDUPTHER

## 2020-01-15 RX ORDER — ESOMEPRAZOLE MAGNESIUM 40 MG/1
40 CAPSULE, DELAYED RELEASE ORAL
Qty: 60 CAPSULE | Refills: 2 | Status: SHIPPED | OUTPATIENT
Start: 2020-01-15 | End: 2020-04-06 | Stop reason: SDUPTHER

## 2020-01-15 RX ORDER — PROPOFOL 10 MG/ML
INJECTION, EMULSION INTRAVENOUS AS NEEDED
Status: DISCONTINUED | OUTPATIENT
Start: 2020-01-15 | End: 2020-01-15 | Stop reason: SURG

## 2020-01-15 RX ORDER — SODIUM CHLORIDE, SODIUM LACTATE, POTASSIUM CHLORIDE, CALCIUM CHLORIDE 600; 310; 30; 20 MG/100ML; MG/100ML; MG/100ML; MG/100ML
125 INJECTION, SOLUTION INTRAVENOUS CONTINUOUS
Status: DISCONTINUED | OUTPATIENT
Start: 2020-01-15 | End: 2020-01-19 | Stop reason: HOSPADM

## 2020-01-15 RX ADMIN — PROPOFOL 50 MG: 10 INJECTION, EMULSION INTRAVENOUS at 07:54

## 2020-01-15 RX ADMIN — SODIUM CHLORIDE, SODIUM LACTATE, POTASSIUM CHLORIDE, AND CALCIUM CHLORIDE 125 ML/HR: .6; .31; .03; .02 INJECTION, SOLUTION INTRAVENOUS at 07:03

## 2020-01-15 RX ADMIN — PROPOFOL 200 MG: 10 INJECTION, EMULSION INTRAVENOUS at 07:52

## 2020-01-15 NOTE — ANESTHESIA PREPROCEDURE EVALUATION
Review of Systems/Medical History  Patient summary reviewed  Chart reviewed      Cardiovascular  Hyperlipidemia,    Pulmonary  Negative pulmonary ROS        GI/Hepatic    GERD ,        Negative  ROS        Endo/Other  Diabetes type 2 Oral agent, History of thyroid disease , hypothyroidism,      GYN  Negative gynecology ROS          Hematology  Negative hematology ROS      Musculoskeletal  Negative musculoskeletal ROS        Neurology  Negative neurology ROS      Psychology   Negative psychology ROS              Physical Exam    Airway    Mallampati score: III  TM Distance: <3 FB  Neck ROM: full     Dental   No notable dental hx     Cardiovascular  Rhythm: regular, Rate: normal,     Pulmonary  Pulmonary exam normal Breath sounds clear to auscultation,     Other Findings        Anesthesia Plan  ASA Score- 2     Anesthesia Type- IV sedation with anesthesia with ASA Monitors  Additional Monitors:   Airway Plan:         Plan Factors-    Induction- intravenous  Postoperative Plan- Plan for postoperative opioid use  Informed Consent- Anesthetic plan and risks discussed with patient  I personally reviewed this patient with the CRNA  Discussed and agreed on the Anesthesia Plan with the CRNA  Anika Mueller

## 2020-01-15 NOTE — H&P
History and Physical - [facility]  Darnell Senior 64 y o  female MRN: [id number]                  HPI: Darnell Senior is a 64y o  year old female who presents for endoscopy for evaluation of GERD      REVIEW OF SYSTEMS: Per the HPI, and otherwise unremarkable  Historical Information   Past Medical History:   Diagnosis Date    Diabetes mellitus ([address] )     GERD (gastroesophageal reflux disease)     Hyperlipidemia     Hypothyroidism      Past Surgical History:   Procedure Laterality Date    CHOLECYSTECTOMY      HAND SURGERY      TONSILLECTOMY       Social History   Social History     Substance and Sexual Activity   Alcohol Use No     Social History     Substance and Sexual Activity   Drug Use No     Social History     Tobacco Use   Smoking Status Former Smoker   Smokeless Tobacco Never Used     Family History   Problem Relation Age of Onset    Breast cancer Mother     Hyperlipidemia Mother     Diabetes Father     Hypertension Father        Meds/Allergies       (Not in a hospital admission)    Allergies   Allergen Reactions    Other Anaphylaxis     All berries causes throat swelling    Shellfish-Derived Products Anaphylaxis    Epinephrine      Other reaction(s): dizzy, near syncope    Lidocaine Dizziness and Syncope     Patient has had syncopal episodes with local anesthesia   Sodium Chloride     Cefprozil Rash    Sulfa Antibiotics Rash       Objective     Blood pressure 142/75, pulse 90, temperature 97 8 °F (36 6 °C), temperature source Temporal, resp  rate 16, height 5' 7" (1 702 m), weight 96 6 kg (212 lb 15 4 oz), SpO2 99 %        PHYSICAL EXAM    /75   Pulse 90   Temp 97 8 °F (36 6 °C) (Temporal)   Resp 16   Ht 5' 7" (1 702 m)   Wt 96 6 kg (212 lb 15 4 oz)   SpO2 99%   BMI 33 35 kg/m²       Gen: NAD  CV: RRR  CHEST: Clear  ABD: soft, NT/ND  EXT: no edema      ASSESSMENT/PLAN:  This is a 64y o  year old female here for endoscopy, and she is stable and optimized for her procedure

## 2020-01-15 NOTE — DISCHARGE INSTRUCTIONS
Upper Endoscopy   WHAT YOU NEED TO KNOW:   An upper endoscopy is also called an upper gastrointestinal (GI) endoscopy, or an esophagogastroduodenoscopy (EGD)  You may feel bloated, gassy, or have some abdominal discomfort after your procedure  Your throat may be sore for 24 to 36 hours  You may burp or pass gas from air that is still inside your body  DISCHARGE INSTRUCTIONS:   Call 911 if:   · You have sudden chest pain or trouble breathing  Seek care immediately if:   · You feel dizzy or faint  · You have trouble swallowing  · You have severe throat pain  · Your bowel movements are very dark or black  · Your abdomen is hard and firm and you have severe pain  · You vomit blood  Contact your healthcare provider if:   · You feel full or bloated and cannot burp or pass gas  · You have not had a bowel movement for 3 days after your procedure  · You have neck pain  · You have a fever or chills  · You have nausea or are vomiting  · You have a rash or hives  · You have questions or concerns about your endoscopy  Relieve a sore throat:  Suck on throat lozenges or crushed ice  Gargle with a small amount of warm salt water  Mix 1 teaspoon of salt and 1 cup of warm water to make salt water  Relieve gas and discomfort from bloating:  Lie on your right side with a heating pad on your abdomen  Take short walks to help pass gas  Eat small meals until bloating is relieved  Rest after your procedure:  Do not drive or make important decisions until the day after your procedure  Return to your normal activity as directed  You can usually return to work the day after your procedure  Follow up with your healthcare provider as directed:  Write down your questions so you remember to ask them during your visits  © 2017 [employer]  Information is for End User's use only and may not be sold, redistributed or otherwise used for commercial purposes   All illustrations and images included in  are the copyrighted property of [employer] , [employer]  or Benjamín Beatty  The above information is an  only  It is not intended as medical advice for individual conditions or treatments  Talk to your doctor, nurse or pharmacist before following any medical regimen to see if it is safe and effective for you

## 2020-01-15 NOTE — ANESTHESIA POSTPROCEDURE EVALUATION
Post-Op Assessment Note    CV Status:  Stable  Pain Score: 0    Pain management: adequate     Mental Status:  Alert and awake   Hydration Status:  Stable   PONV Controlled:  None   Airway Patency:  Patent and adequate   Post Op Vitals Reviewed: Yes      Staff: Anesthesiologist, CRNA           /70 (01/15/20 0800)    Temp (!) 97 4 °F (36 3 °C) (01/15/20 0800)    Pulse 82 (01/15/20 0800)   Resp 20 (01/15/20 0800)    SpO2 96 % (01/15/20 0800)

## 2020-01-24 ENCOUNTER — TELEPHONE (OUTPATIENT)
Dept: GASTROENTEROLOGY | Facility: CLINIC | Age: 60
End: 2020-01-24

## 2020-01-24 NOTE — TELEPHONE ENCOUNTER
Pt requested I call pathology to find out when her path will be ready  Spoke with path dept they advised path is still not available and still pending  CB and advised, we will call her once we receive it back

## 2020-04-06 DIAGNOSIS — K21.9 CHRONIC GERD: ICD-10-CM

## 2020-04-07 RX ORDER — ESOMEPRAZOLE MAGNESIUM 40 MG/1
40 CAPSULE, DELAYED RELEASE ORAL DAILY
Qty: 90 CAPSULE | Refills: 1 | Status: SHIPPED | OUTPATIENT
Start: 2020-04-07 | End: 2020-09-17 | Stop reason: SDUPTHER

## 2020-04-16 ENCOUNTER — TELEPHONE (OUTPATIENT)
Dept: GASTROENTEROLOGY | Facility: CLINIC | Age: 60
End: 2020-04-16

## 2020-04-20 ENCOUNTER — TELEMEDICINE (OUTPATIENT)
Dept: GASTROENTEROLOGY | Facility: CLINIC | Age: 60
End: 2020-04-20
Payer: COMMERCIAL

## 2020-04-20 DIAGNOSIS — K22.70 BARRETT'S ESOPHAGUS WITHOUT DYSPLASIA: Primary | ICD-10-CM

## 2020-04-20 DIAGNOSIS — K21.9 CHRONIC GERD: ICD-10-CM

## 2020-04-20 PROCEDURE — 99213 OFFICE O/P EST LOW 20 MIN: CPT | Performed by: INTERNAL MEDICINE

## 2020-05-12 ENCOUNTER — TELEPHONE (OUTPATIENT)
Dept: GASTROENTEROLOGY | Facility: CLINIC | Age: 60
End: 2020-05-12

## 2020-05-12 DIAGNOSIS — R10.11 RIGHT UPPER QUADRANT ABDOMINAL PAIN: Primary | ICD-10-CM

## 2020-05-12 RX ORDER — DICYCLOMINE HCL 20 MG
20 TABLET ORAL 4 TIMES DAILY PRN
Qty: 90 TABLET | Refills: 1 | Status: SHIPPED | OUTPATIENT
Start: 2020-05-12 | End: 2020-06-26

## 2020-05-13 ENCOUNTER — TELEPHONE (OUTPATIENT)
Dept: GASTROENTEROLOGY | Facility: CLINIC | Age: 60
End: 2020-05-13

## 2020-05-14 ENCOUNTER — TELEPHONE (OUTPATIENT)
Dept: GASTROENTEROLOGY | Facility: CLINIC | Age: 60
End: 2020-05-14

## 2020-05-14 DIAGNOSIS — R10.84 GENERALIZED ABDOMINAL PAIN: Primary | ICD-10-CM

## 2020-05-14 RX ORDER — HYOSCYAMINE SULFATE 0.125 MG
0.12 TABLET ORAL EVERY 4 HOURS PRN
Qty: 30 TABLET | Refills: 0 | Status: SHIPPED | OUTPATIENT
Start: 2020-05-14

## 2020-05-18 ENCOUNTER — TELEPHONE (OUTPATIENT)
Dept: GASTROENTEROLOGY | Facility: CLINIC | Age: 60
End: 2020-05-18

## 2020-09-17 DIAGNOSIS — K21.9 CHRONIC GERD: ICD-10-CM

## 2020-09-17 RX ORDER — ESOMEPRAZOLE MAGNESIUM 40 MG/1
40 CAPSULE, DELAYED RELEASE ORAL DAILY
Qty: 90 CAPSULE | Refills: 2 | Status: SHIPPED | OUTPATIENT
Start: 2020-09-17 | End: 2021-06-28 | Stop reason: SDUPTHER

## 2020-09-17 NOTE — TELEPHONE ENCOUNTER
Dr Dhaliwal Flair - patient called [facility] - refill  Esomeprazole 40 mg 1 capsule daily  90 day supply with 3 refills  Please call [facility]

## 2020-12-15 ENCOUNTER — TELEPHONE (OUTPATIENT)
Dept: NEUROLOGY | Facility: CLINIC | Age: 60
End: 2020-12-15

## 2021-01-05 ENCOUNTER — CONSULT (OUTPATIENT)
Dept: NEUROLOGY | Facility: CLINIC | Age: 61
End: 2021-01-05
Payer: COMMERCIAL

## 2021-01-05 VITALS
DIASTOLIC BLOOD PRESSURE: 74 MMHG | BODY MASS INDEX: 33.9 KG/M2 | SYSTOLIC BLOOD PRESSURE: 128 MMHG | HEART RATE: 87 BPM | WEIGHT: 216 LBS | HEIGHT: 67 IN

## 2021-01-05 DIAGNOSIS — R41.3 MEMORY DIFFICULTY: ICD-10-CM

## 2021-01-05 DIAGNOSIS — G44.86 CERVICOGENIC HEADACHE: ICD-10-CM

## 2021-01-05 DIAGNOSIS — M54.2 CERVICALGIA: Primary | ICD-10-CM

## 2021-01-05 PROCEDURE — 99204 OFFICE O/P NEW MOD 45 MIN: CPT | Performed by: PSYCHIATRY & NEUROLOGY

## 2021-01-05 RX ORDER — IBUPROFEN AND FAMOTIDINE 800; 26.6 MG/1; MG/1
TABLET, COATED ORAL
COMMUNITY
Start: 2020-12-22 | End: 2021-07-15 | Stop reason: ALTCHOICE

## 2021-01-05 RX ORDER — CALCIUM CARBONATE 300MG(750)
TABLET,CHEWABLE ORAL DAILY
COMMUNITY

## 2021-01-05 RX ORDER — MELOXICAM 15 MG/1
TABLET ORAL AS NEEDED
COMMUNITY
Start: 2020-12-08

## 2021-01-05 RX ORDER — DIPHENHYDRAMINE HCL 25 MG
25 CAPSULE ORAL EVERY 6 HOURS PRN
COMMUNITY

## 2021-01-05 RX ORDER — ATORVASTATIN CALCIUM 20 MG/1
20 TABLET, FILM COATED ORAL DAILY
COMMUNITY
Start: 2020-10-20 | End: 2021-10-20

## 2021-01-05 RX ORDER — ASCORBIC ACID 500 MG
500 TABLET ORAL DAILY
COMMUNITY

## 2021-01-05 RX ORDER — NITROGLYCERIN 0.3 MG/1
0.3 TABLET SUBLINGUAL
COMMUNITY
Start: 2020-08-31 | End: 2021-08-31

## 2021-01-05 NOTE — PROGRESS NOTES
Ramiro Ford is a 64 y o  female who presents with complaints of headache, neck pain and memory disturbance    Assessment:  1  Cervicalgia    2  Cervicogenic headache    3  Memory difficulty        Plan:  Initiate physical therapy   Follow-up 6-8 weeks    Discussion:   Keysha Parker reports symptoms of headache localized to the posterior head region, neck pain / stiffness and memory difficulty that began about 3 months ago  She states the symptoms were preceded by arthralgias in various parts of her body that began shortly after a tick bite  Her Lyme titer was negative but she was not treated empirically with doxycycline and the day that she finished doxycycline was when she started to notice the symptoms of neck pain, headache and memory difficulty  She has since been seen by Orthopedics and was started on anti-inflammatory medication and had an MRI of her cervical spine done which she reports shows "stenosis"  Physical therapy has been recommended and she anticipates starting this in the near future  With all of this she has noticed a significant improvement in her symptoms over the last 3 or 4 weeks  Her neurologic exam is nonfocal and suspect that her symptoms are related more to her degenerative disease in her neck which precipitated cervicogenic headaches and resulted in concentration difficulties  Will plan to re-evaluate her after she completes physical therapy and if her symptoms worsen she will notify me      Subjective:    HPI   Keysha Parker is a right-handed woman who presents with the above complaints  She states that in late summer she had a tick bite from a small tick  She states she removed the tick and shortly thereafter started to have arthralgias in various parts of her body including her shoulders and knees  She states that she questioned if that tick bite may have resulted in Lyme disease and contacted her primary physician    A Lyme titer was performed but was negative but because of her symptoms she was treated for 3-4 weeks with doxycycline  She states that within a few days of starting the antibiotics most of her joint pain symptoms resolved  She states she took her last dose of doxycycline in the morning of September 24th and that evening started to develop symptoms of neck pain and stiffness  She states these symptoms amplified and were associated with headaches  She states the headaches were stabbing type pains which localized to the posterior head region  She states that at times these were intense in were "almost migraines" is with photophobia and nausea associated with them"  She states the was getting the symptoms on a daily basis and were at times quite severe  She states associated with this she was finding that she was having a hard time remembering things, she states she was having problems with short-term memory, putting things in places and then forgetting where they were  She was seen by infectious disease who performed another Lyme titer which again was negative and it was felt that this was not related to Lyme disease  She states more recently she was seen by Orthopedics and had an MRI of her cervical spine done which she states demonstrated stenosis ( report is not available for my review)  She states she is also placed on anti-inflammatory medications and with this she states she has noticed a significant improvement in her symptoms of neck pain and stiffness as well as headaches  She states that she is not pain free and will typically have a pain level of at most 1 or 2 on a daily basis  She states she no longer gets headaches daily but intermittently  She states that with this she also finds that her concentration and memory seems to be doing better and no longer has the "foggy feeling" that she was experiencing  She has seen chiropractors in the past for neck pain and more recently has noted some numbness and tingling in the dorsal aspect of the lateral hand    She finds that tapping on the wrist seems to amplify this tingling      Past Medical History:   Diagnosis Date    Diabetes mellitus ([address] )     GERD (gastroesophageal reflux disease)     Headache     Hyperlipidemia     Hypothyroidism     Neck pain        Family History:  Family History   Problem Relation Age of Onset    Breast cancer Mother     Hyperlipidemia Mother     Diabetes Father     Hypertension Father        Past Surgical History:  Past Surgical History:   Procedure Laterality Date    CHOLECYSTECTOMY      HAND SURGERY      TONSILLECTOMY         Social History:   reports that she has quit smoking  She has never used smokeless tobacco  She reports that she does not drink alcohol or use drugs  Allergies:   Other, Shellfish-derived products, Clindamycin, Epinephrine, Lidocaine, Sodium chloride, Cefprozil, and Sulfa antibiotics      Current Outpatient Medications:     ascorbic acid (VITAMIN C) 500 mg tablet, Take 500 mg by mouth daily, Disp: , Rfl:     atorvastatin (LIPITOR) 20 mg tablet, Take 20 mg by mouth daily, Disp: , Rfl:     Calcium Carbonate (CALCIUM 500 PO), Take by mouth 1,000 Daily, Disp: , Rfl:     Cholecalciferol 50 MCG (2000 UT) CAPS, 1 tablet , Disp: , Rfl:     diphenhydrAMINE (BENADRYL) 25 mg capsule, Take 25 mg by mouth every 6 (six) hours as needed for itching, Disp: , Rfl:     esomeprazole (NexIUM) 40 MG capsule, Take 1 capsule (40 mg total) by mouth daily, Disp: 90 capsule, Rfl: 2    FERROUS SULFATE DRIED PO, 1 tablet, Disp: , Rfl:     levothyroxine 50 mcg tablet, Take 75 mcg by mouth , Disp: , Rfl:     Magnesium 400 MG TABS, Take by mouth daily, Disp: , Rfl:     meloxicam (MOBIC) 15 mg tablet, as needed, Disp: , Rfl:     mometasone (NASONEX) 50 mcg/act nasal spray, 2 sprays into each nostril, Disp: , Rfl:     nitroglycerin (NITROSTAT) 0 3 mg SL tablet, Place 0 3 mg under the tongue, Disp: , Rfl:     Zinc Sulfate (ZINC 15 PO), Take by mouth daily, Disp: , Rfl:     atorvastatin (LIPITOR) 20 mg tablet, Take 20 mg by mouth, Disp: , Rfl:     Calcium Carbonate 500 MG CHEW, 1 tablet, Disp: , Rfl:     dicyclomine (BENTYL) 20 mg tablet, Take 1 tablet (20 mg total) by mouth 4 (four) times a day as needed (abdominal pain/cramping), Disp: 90 tablet, Rfl: 1    Duexis 800-26 6 MG TABS, 1 TABLET 3 TIMES A DAY WITH MEALS FOR 30 DAYS, Disp: , Rfl:     hyoscyamine (ANASPAZ,LEVSIN) 0 125 MG tablet, Take 1 tablet (0 125 mg total) by mouth every 4 (four) hours as needed for cramping (Patient not taking: Reported on 1/5/2021), Disp: 30 tablet, Rfl: 0     I have reviewed the past medical, social and family history, current medications, allergies, vitals, review of systems and updated this information as appropriate today     Objective:    Vitals:  Blood pressure 128/74, pulse 87, height 5' 7" (1 702 m), weight 98 kg (216 lb)  Physical Exam    Neurological Exam    GENERAL:  Cooperative in no acute distress  Well-developed and well-nourished    HEAD and NECK   Head is atraumatic normocephalic with no lesions or masses  Neck is supple with full range of motion    CARDIOVASCULAR  Carotid Arteries-no carotid bruits  NEUROLOGIC:  Mental Status-the patient is awake alert and oriented without aphasia or apraxia   She was oriented 9/10 (told me it was Monday instead of Tuesday)  Was able to spell world backwards without difficulty  Was able to recall 3 of 3 objects immediately, 2 of 3 objects after a few minutes, 3 of 3 objects with minimal prompting  Cranial Nerves: Visual fields are full to confrontation  Discs are flat  Extraocular movements are full without nystagmus  Pupils are 2-1/2 mm and reactive  Face is symmetrical to light touch  Movements of facial expression move symmetrically  Hearing is normal to finger rub bilaterally  Soft palate lifts symmetrically  Shoulder shrug is symmetrical  Tongue is midline without atrophy  Motor: No drift is noted on arm extension   Strength is full in the upper and lower extremities with normal bulk and tone  Sensory: Intact to temperature and vibratory sensation in the upper and lower extremities bilaterally  Cortical function is intact  Coordination: Finger to nose testing is performed accurately  Romberg is negative  Gait reveals a normal base with symmetrical arm swing  Tandem walk is normal   Reflexes: 1/4 in the biceps, triceps and brachioradialis regions, 2 at the knees and 1 at the ankles  Toes are downgoing            ROS:    Review of Systems   Constitutional: Negative  Negative for appetite change and fever  HENT: Negative  Negative for hearing loss, tinnitus, trouble swallowing and voice change  Taste- salty taste    Eyes: Positive for visual disturbance (blurred vision)  Negative for photophobia and pain  Dry eyes, vision loss   Respiratory: Negative  Negative for shortness of breath  Cardiovascular: Negative  Negative for palpitations  Gastrointestinal: Negative  Negative for nausea and vomiting  Endocrine: Negative  Negative for cold intolerance  Genitourinary: Negative  Negative for dysuria, frequency and urgency  Musculoskeletal: Positive for neck pain  Negative for myalgias  Joint pain   Skin: Negative  Negative for rash  Hair loss   Allergic/Immunologic: Negative  Neurological: Positive for headaches  Negative for dizziness, tremors, seizures, syncope, facial asymmetry, speech difficulty, weakness, light-headedness and numbness  Hematological: Negative  Does not bruise/bleed easily  Psychiatric/Behavioral: Positive for confusion  Negative for hallucinations and sleep disturbance

## 2021-01-28 ENCOUNTER — TELEPHONE (OUTPATIENT)
Dept: NEUROLOGY | Facility: CLINIC | Age: 61
End: 2021-01-28

## 2021-01-28 NOTE — TELEPHONE ENCOUNTER
pt called and states that she has been getting a salty taste in her mouth  she has had this for 2 days now and in the past had this but it would only last 1 day  this is the 4th time she got this since the end of nov   she is also having occassional ringing in her ears, occurs mostly when laying down, happens atleast once a day, not constant  lasts less than 1min  also feels like there is something fizzing around in her head, mostly when she is laying down, today slight light headedness and head fog      No new meds  Please advise  [phone] to leave a detailed message

## 2021-01-29 DIAGNOSIS — R41.3 MEMORY DIFFICULTY: Primary | ICD-10-CM

## 2021-01-29 DIAGNOSIS — R42 DIZZINESS AND GIDDINESS: ICD-10-CM

## 2021-01-29 DIAGNOSIS — R51.9 NONINTRACTABLE EPISODIC HEADACHE, UNSPECIFIED HEADACHE TYPE: ICD-10-CM

## 2021-01-29 NOTE — TELEPHONE ENCOUNTER
Spoke with patient who reports that the salty taste began after an intensive physical therapy work out  She has not been having headaches since that time  She does report intermittent tinnitus especially at nighttime when laying down as well as a feeling of dizziness and continues to have issues with her memory    Will order MRI brain and she will keep her scheduled follow-up appointment

## 2021-02-17 ENCOUNTER — HOSPITAL ENCOUNTER (OUTPATIENT)
Dept: MRI IMAGING | Facility: CLINIC | Age: 61
Discharge: HOME | End: 2021-02-17

## 2021-02-17 DIAGNOSIS — R51.9 NONINTRACTABLE EPISODIC HEADACHE, UNSPECIFIED HEADACHE TYPE: ICD-10-CM

## 2021-02-17 DIAGNOSIS — R42 DIZZINESS AND GIDDINESS: ICD-10-CM

## 2021-02-17 DIAGNOSIS — R41.3 MEMORY DIFFICULTY: ICD-10-CM

## 2021-03-02 ENCOUNTER — HOSPITAL ENCOUNTER (OUTPATIENT)
Dept: MRI IMAGING | Facility: CLINIC | Age: 61
Discharge: HOME | End: 2021-03-02
Payer: COMMERCIAL

## 2021-03-02 PROCEDURE — G1004 CDSM NDSC: HCPCS

## 2021-03-02 PROCEDURE — 70551 MRI BRAIN STEM W/O DYE: CPT

## 2021-03-10 DIAGNOSIS — Z23 ENCOUNTER FOR IMMUNIZATION: ICD-10-CM

## 2021-04-01 ENCOUNTER — TELEPHONE (OUTPATIENT)
Dept: NEUROLOGY | Facility: CLINIC | Age: 61
End: 2021-04-01

## 2021-04-01 NOTE — TELEPHONE ENCOUNTER
Patient stopped by the office to drop off a disk for Mri   Images downloaded in Epic    Results of MRI CERVICAL SPINE W/O scannd into chart for review before appointment on Tues 04/06/2021

## 2021-04-14 ENCOUNTER — OFFICE VISIT (OUTPATIENT)
Dept: NEUROLOGY | Facility: CLINIC | Age: 61
End: 2021-04-14
Payer: COMMERCIAL

## 2021-04-14 VITALS
SYSTOLIC BLOOD PRESSURE: 160 MMHG | BODY MASS INDEX: 33.59 KG/M2 | HEART RATE: 78 BPM | HEIGHT: 67 IN | WEIGHT: 214 LBS | DIASTOLIC BLOOD PRESSURE: 94 MMHG

## 2021-04-14 DIAGNOSIS — G44.86 CERVICOGENIC HEADACHE: Primary | ICD-10-CM

## 2021-04-14 DIAGNOSIS — M54.2 CERVICALGIA: ICD-10-CM

## 2021-04-14 DIAGNOSIS — R41.3 MEMORY DIFFICULTY: ICD-10-CM

## 2021-04-14 PROCEDURE — 99213 OFFICE O/P EST LOW 20 MIN: CPT | Performed by: PSYCHIATRY & NEUROLOGY

## 2021-04-14 NOTE — PROGRESS NOTES
Marquez Joshi is a 63 y o  female  Returns in follow-up today with history of headaches and neck pain    Assessment:  1  Cervicogenic headache    2  Cervicalgia    3  Memory difficulty        Plan:  Follow-up in 6 months with [place]    Discussion:   Ihor Oppenheim reports significant improvement in neck pain and headaches with physical therapy  She states that as long as she continues her home exercises her neck pain is under good control  Her MRI of the brain was remarkable for some minimal cerebellar ectopia without olivia Chiari malformation  This was discussed and is asymptomatic  MRI of her cervical spine was reviewed with spondylitic changes with very mild stenosis at C4-5 and C6-7  She states her memory has improved but is still little more forgetful and anticipate seeing her back in 6 months with [place]  In the interim she will continue home exercises      Subjective:    HPI   Ihor Oppenheim returns in follow-up today  She reports that since she has completed physical therapy she has had a significant improvement her neck pain symptoms  She states that with this her headaches are gone  She states that she has been doing her cervical exercises fairly regularly but when she skips a few days she starts to develop some neck pain again  She did drop off her cervical MRI report which was reviewed  MRI of the brain was unremarkable  Did describe some borderline cerebellar ectopia without olivia Chiari malformation  Suspect this is a developmental issue that is asymptomatic  She continues to report a little bit of forgetfulness but not as bad as it was    She states she recently had her 2nd COVID vaccine and developed an allergic reaction with swelling and she is recovering from this      Past Medical History:   Diagnosis Date    Diabetes mellitus ([address] )     GERD (gastroesophageal reflux disease)     Headache     Hyperlipidemia     Hypothyroidism     Neck pain     Tick bite        Family History:  Family History Problem Relation Age of Onset    Breast cancer Mother     Hyperlipidemia Mother     Diabetes Father     Hypertension Father        Past Surgical History:  Past Surgical History:   Procedure Laterality Date    CHOLECYSTECTOMY      HAND SURGERY      TONSILLECTOMY         Social History:   reports that she has quit smoking  She has never used smokeless tobacco  She reports that she does not drink alcohol or use drugs  Allergies:   Other, Shellfish-derived products - food allergy, Clindamycin, Epinephrine, Lidocaine, Sodium chloride, Cefprozil, and Sulfa antibiotics      Current Outpatient Medications:     ascorbic acid (VITAMIN C) 500 mg tablet, Take 500 mg by mouth daily, Disp: , Rfl:     atorvastatin (LIPITOR) 20 mg tablet, Take 20 mg by mouth daily, Disp: , Rfl:     Calcium Carbonate (CALCIUM 500 PO), Take by mouth daily Calcium- Mag- zinc, Disp: , Rfl:     Calcium Carbonate 500 MG CHEW, 1 tablet, Disp: , Rfl:     Cholecalciferol 50 MCG (2000 UT) CAPS, 1 tablet daily , Disp: , Rfl:     diphenhydrAMINE (BENADRYL) 25 mg capsule, Take 25 mg by mouth every 6 (six) hours as needed for itching, Disp: , Rfl:     esomeprazole (NexIUM) 40 MG capsule, Take 1 capsule (40 mg total) by mouth daily, Disp: 90 capsule, Rfl: 2    Ferrous Sulfate Dried 200 (65 Fe) MG TABS, 1 tablet daily , Disp: , Rfl:     Levothyroxine Sodium 75 MCG CAPS, Take 75 mcg by mouth , Disp: , Rfl:     mometasone (NASONEX) 50 mcg/act nasal spray, 2 sprays into each nostril, Disp: , Rfl:     Zinc Sulfate (ZINC 15 PO), Take by mouth daily, Disp: , Rfl:     dicyclomine (BENTYL) 20 mg tablet, Take 1 tablet (20 mg total) by mouth 4 (four) times a day as needed (abdominal pain/cramping), Disp: 90 tablet, Rfl: 1    Duexis 800-26 6 MG TABS, 1 TABLET 3 TIMES A DAY WITH MEALS FOR 30 DAYS, Disp: , Rfl:     hyoscyamine (ANASPAZ,LEVSIN) 0 125 MG tablet, Take 1 tablet (0 125 mg total) by mouth every 4 (four) hours as needed for cramping (Patient not taking: Reported on 1/5/2021), Disp: 30 tablet, Rfl: 0    Magnesium 400 MG TABS, Take by mouth daily, Disp: , Rfl:     meloxicam (MOBIC) 15 mg tablet, as needed, Disp: , Rfl:     nitroglycerin (NITROSTAT) 0 3 mg SL tablet, Place 0 3 mg under the tongue, Disp: , Rfl:      I have reviewed the past medical, social and family history, current medications, allergies, vitals, review of systems and updated this information as appropriate today     Objective:    Vitals:  Blood pressure 160/94, pulse 78, height 5' 7" (1 702 m), weight 97 1 kg (214 lb)  Physical Exam    Neurological Exam   GENERAL:  Well-developed well-nourished woman in no acute distress  HEENT/NECK: Head is atraumatic normocephalic, neck is supple  NEUROLOGIC:  Mental Status: Awake and alert without aphasia  Cranial Nerves: Extraocular movements are full  Face is symmetrical  Coordination: gait is stable            ROS:    Review of Systems   Constitutional: Negative  Negative for appetite change and fever  HENT: Positive for trouble swallowing (since receiving the vaccine)  Negative for hearing loss, tinnitus and voice change  Eyes: Negative  Negative for photophobia and pain  Respiratory: Negative  Negative for shortness of breath  Cardiovascular: Negative  Negative for palpitations  Gastrointestinal: Negative  Negative for nausea and vomiting  Endocrine: Negative  Negative for cold intolerance  Genitourinary: Negative  Negative for dysuria, frequency and urgency  Musculoskeletal: Positive for gait problem and neck pain  Negative for myalgias  Skin: Negative  Negative for rash  Neurological: Positive for headaches  Negative for dizziness, tremors, seizures, syncope, facial asymmetry, speech difficulty, weakness, light-headedness and numbness  Facial swelling since Pf[employer] Vaccine   Hematological: Negative  Does not bruise/bleed easily  Psychiatric/Behavioral: Positive for confusion (improved)   Negative for hallucinations and sleep disturbance

## 2021-05-24 ENCOUNTER — TELEPHONE (OUTPATIENT)
Dept: GASTROENTEROLOGY | Facility: CLINIC | Age: 61
End: 2021-05-24

## 2021-05-24 NOTE — TELEPHONE ENCOUNTER
KENDRA: Spoke with patient  History of GERD    Patient c/o passing red blood during her BM, and when wiping in mucous  Patient is straining to have a BM  She has attached some photos in media to review  She also c/o a tender upper abdomen  Patient will come into an OV Thursday with a PA  She has not been seen since January of 2020  Encouraged patient to drink plenty of fluids, start a stool softener, small frequent meals  She will follow-up in OV

## 2021-05-24 NOTE — TELEPHONE ENCOUNTER
----- Message from Sherry Sanz  [place] sent at 5/24/2021  1:31 PM EDT -----  Regarding: Non-Urgent Medical Question  Contact: [phone]  Dear Dr Gian Munson,    I have been having bloody stools and bloody mucus since 5/16  I called for an appointment on 5/18 and couldn't get an appointment with you until 6/22  I think that is too long for this medical issue to go on without evaluation  Today is over a week now since the bloody stools and bloody mucus began and I do have abdominal discomfort located in abdomen area to the side of my stomach below rib cage and accompanied with constipation  I am afraid to take anything for the constipation because of the bleeding  I attached some gross pictures of the bloody mucus and stools from this past weekend  Missing, however, was the first bloody stool that was brown and red striped like a candy cane  I was on Prednisone (60 mg for 2 weeks, then 40 mg, then 20 mg, then 10 mg) for 4 weeks total after my face swelled up and I had trouble swallowing from an allergic reaction to the 2nd Pfizer vaccine and don't know if that could be the cause  I know it is not good to be on that steroid for that long, but I am off the steroids 2 weeks now  I attached those images  Sorry they are disgusting  Can you please reach out and advise what you can do for me?

## 2021-05-25 RX ORDER — PREDNISONE 20 MG/1
40 TABLET ORAL DAILY
COMMUNITY
Start: 2021-04-12 | End: 2021-07-15 | Stop reason: ALTCHOICE

## 2021-05-25 RX ORDER — ALBUTEROL SULFATE 90 UG/1
2 AEROSOL, METERED RESPIRATORY (INHALATION) EVERY 6 HOURS PRN
COMMUNITY
Start: 2021-04-12 | End: 2022-04-12

## 2021-05-25 RX ORDER — CLINDAMYCIN PHOSPHATE 11.9 MG/ML
SOLUTION TOPICAL
COMMUNITY

## 2021-05-25 RX ORDER — TACROLIMUS 1 MG/G
OINTMENT TOPICAL
COMMUNITY
Start: 2021-02-23

## 2021-05-27 ENCOUNTER — OFFICE VISIT (OUTPATIENT)
Dept: GASTROENTEROLOGY | Facility: CLINIC | Age: 61
End: 2021-05-27
Payer: COMMERCIAL

## 2021-05-27 VITALS
HEART RATE: 91 BPM | BODY MASS INDEX: 30.92 KG/M2 | DIASTOLIC BLOOD PRESSURE: 88 MMHG | HEIGHT: 67 IN | WEIGHT: 197 LBS | SYSTOLIC BLOOD PRESSURE: 156 MMHG

## 2021-05-27 DIAGNOSIS — K62.5 RECTAL BLEEDING: Primary | ICD-10-CM

## 2021-05-27 PROCEDURE — 99213 OFFICE O/P EST LOW 20 MIN: CPT | Performed by: PHYSICIAN ASSISTANT

## 2021-05-27 RX ORDER — LORATADINE 10 MG/1
10 TABLET ORAL DAILY
COMMUNITY

## 2021-05-27 NOTE — PROGRESS NOTES
[facility]  Venkata Rachael 63 y o  female MRN: [id number]       CC: Rectal bleeding    HPI: Bettie Aguila is a 62-year-old female with history of type 2 diabetes, hypothyroidism, and hyperlipidemia  Patient is known to us for history of chronic GERD  She presents to the office today for new onset rectal bleeding that began on the 16th  Patient reports that she has also been straining to have bowel movements recently, and started Dulcolax daily  She reports that this has helped the consistency her of her stool by 50%  She is still having some straining  She is also having right-sided abdominal discomfort  Patient is currently recovering from allergic reactions secondary to 5 0 vaccine as she discovered that she has a polyethylene glycol allergy  Patient reports that her last colonoscopy was in 2013  Unfortunately, those records are not available to me  Her last EGD was in January 2020 revealing irregular Z-line and mild nonerosive gastritis  Biopsies were negative for Rehman's esophagus at that time  Review of Systems:    CONSTITUTIONAL: Denies any fever, chills, or rigors  Good appetite, and no recent weight loss  HEENT: No earache or tinnitus  Denies hearing loss or visual disturbances  CARDIOVASCULAR: No chest pain or palpitations  RESPIRATORY: Denies any cough, hemoptysis, shortness of breath or dyspnea on exertion  GASTROINTESTINAL: As noted in the History of Present Illness  GENITOURINARY: No problems with urination  Denies any hematuria or dysuria  NEUROLOGIC: No dizziness or vertigo, denies headaches  MUSCULOSKELETAL: Denies any muscle or joint pain  SKIN: Denies skin rashes or itching  ENDOCRINE: Denies excessive thirst  Denies intolerance to heat or cold  PSYCHOSOCIAL: Denies depression or anxiety  Denies any recent memory loss         Current Outpatient Medications   Medication Sig Dispense Refill    albuterol (PROVENTIL HFA,VENTOLIN HFA) 90 mcg/act inhaler Inhale 2 puffs every 6 (six) hours as needed      ascorbic acid (VITAMIN C) 500 mg tablet Take 500 mg by mouth daily      atorvastatin (LIPITOR) 20 mg tablet Take 20 mg by mouth daily      benzoyl peroxide 5 % external liquid benzoyl peroxide 5 % topical cleanser   WASH AFFECTED AREA DAILY DURING SHOWER      Calcium Carbonate (CALCIUM 500 PO) Take by mouth daily Calcium- Mag- zinc      Calcium Carbonate 500 MG CHEW 1 tablet      Cholecalciferol 50 MCG (2000 UT) CAPS 1 tablet daily       clindamycin (CLEOCIN T) 1 % external solution clindamycin phosphate 1 % topical solution   APPLY TO AFFECTED AREA IN GROIN TWICE A DAY      diphenhydrAMINE (BENADRYL) 25 mg capsule Take 25 mg by mouth every 6 (six) hours as needed for itching      Duexis 800-26 6 MG TABS 1 TABLET 3 TIMES A DAY WITH MEALS FOR 30 DAYS      esomeprazole (NexIUM) 40 MG capsule Take 1 capsule (40 mg total) by mouth daily 90 capsule 2    Ferrous Sulfate Dried 200 (65 Fe) MG TABS 1 tablet daily       hyoscyamine (ANASPAZ,LEVSIN) 0 125 MG tablet Take 1 tablet (0 125 mg total) by mouth every 4 (four) hours as needed for cramping 30 tablet 0    Levothyroxine Sodium 75 MCG CAPS Take 75 mcg by mouth       loratadine (CLARITIN) 10 mg tablet Take 10 mg by mouth daily      Magnesium 400 MG TABS Take by mouth daily      meloxicam (MOBIC) 15 mg tablet as needed      mometasone (NASONEX) 50 mcg/act nasal spray 2 sprays into each nostril      nitroglycerin (NITROSTAT) 0 3 mg SL tablet Place 0 3 mg under the tongue      predniSONE 20 mg tablet Take 40 mg by mouth daily      tacrolimus (PROTOPIC) 0 1 % ointment       Zinc Sulfate (ZINC 15 PO) Take by mouth daily      dicyclomine (BENTYL) 20 mg tablet Take 1 tablet (20 mg total) by mouth 4 (four) times a day as needed (abdominal pain/cramping) 90 tablet 1     No current facility-administered medications for this visit        Past Medical History:   Diagnosis Date    Diabetes mellitus ([facility] [address] )     GERD (gastroesophageal reflux disease)     Headache     Hyperlipidemia     Hypothyroidism     Neck pain     Tick bite      Past Surgical History:   Procedure Laterality Date    CHOLECYSTECTOMY      HAND SURGERY      TONSILLECTOMY       Social History     Socioeconomic History    Marital status: /Civil Union     Spouse name: None    Number of children: None    Years of education: None    Highest education level: None   Occupational History    None   Social Needs    Financial resource strain: None    Food insecurity     Worry: None     Inability: None    Transportation needs     Medical: None     Non-medical: None   Tobacco Use    Smoking status: Former Smoker    Smokeless tobacco: Never Used   Substance and Sexual Activity    Alcohol use: No    Drug use: No    Sexual activity: Yes     Partners: Male   Lifestyle    Physical activity     Days per week: None     Minutes per session: None    Stress: None   Relationships    Social connections     Talks on phone: None     Gets together: None     Attends Taoism service: None     Active member of club or organization: None     Attends meetings of clubs or organizations: None     Relationship status: None    Intimate partner violence     Fear of current or ex partner: None     Emotionally abused: None     Physically abused: None     Forced sexual activity: None   Other Topics Concern    None   Social History Narrative    None     Family History   Problem Relation Age of Onset    Breast cancer Mother     Hyperlipidemia Mother     Diabetes Father     Hypertension Father             PHYSICAL EXAM:    Vitals:    05/27/21 1003   BP: 156/88   Pulse: 91   Weight: 89 4 kg (197 lb)   Height: 5' 7" (1 702 m)     General Appearance:   Alert and oriented x 3   Cooperative, and in no respiratory distress   HEENT:   Normocephalic, atraumatic, anicteric      Neck:  Supple, symmetrical, trachea midline   Lungs:   Clear to auscultation bilaterally    Heart[de-identified]   Regular rate and rhythm   Abdomen:   Soft, non-tender, non-distended; normal bowel sounds; no masses, no organomegaly    Genitalia:   Deferred    Rectal:   Deferred    Extremities:  No cyanosis, clubbing or edema    Pulses:  2+ and symmetric all extremities    Skin:  Skin color, texture, turgor normal, no rashes or lesions    Lymph nodes:  No palpable cervical or supraclavicular lymphadenopathy        Lab Results:             Invalid input(s): LABALBU            Imaging Studies: I have personally reviewed pertinent imaging studies  Mri Brain Wo Contrast    Result Date: 3/4/2021  Impression: No acute intracranial abnormality  Incidental note is made of mild cerebellar tonsillar ectopia without compression of the tonsils or mass effect upon the cervical medullary junction  Workstation performed: [id number]       ASSESSMENT and PLAN:      1)  New onset rectal bleeding -  Photos of rectal bleeding included in the media section as submitted by the patient on my chart  She would be due for colonoscopy in 2023, however given that these are new symptoms would like to rule out any development of polyp or less likely malignancy  Could be hemorrhoidal bleeding as well  She is currently recovering from finds or vaccine allergic reaction  Patient was previously on high dose prednisone  She reports that she was following with 2 different allergists regarding this  She reports that she was able to tolerate the MiraLax prep in the past   Fortunately, MiraLax does not have as much systemic absorption as it remains in the gut  We will verify this with her allergist   We went over other options for colonoscopy prep, however she cannot take anything with sulfates  Roseline Army would not be ideal either as it causes the patient to have hypertension  Follow up after colonoscopy

## 2021-06-01 ENCOUNTER — TELEPHONE (OUTPATIENT)
Dept: GASTROENTEROLOGY | Facility: CLINIC | Age: 61
End: 2021-06-01

## 2021-06-01 NOTE — TELEPHONE ENCOUNTER
Aviva / Vince Roberto patient - Called allergist and spoke with patient directly  Patient assured me she spoke with her allergist and was given the ok to use the Miralax prep for her colonoscopy on 7/15/21    Thx

## 2021-06-15 ENCOUNTER — TELEPHONE (OUTPATIENT)
Dept: GASTROENTEROLOGY | Facility: CLINIC | Age: 61
End: 2021-06-15

## 2021-06-15 NOTE — TELEPHONE ENCOUNTER
Left message on patient cell phone voicemail providing the prep instructions in the message  Asked the patient to call back to confirm she received the instructions and understands them

## 2021-06-15 NOTE — TELEPHONE ENCOUNTER
Please call patient and tell her that we have no used these preps in the past  Instead, I think a better alternative would be Magnesium Citrate (The [employer] Brand Equate since some other brands contain   Polyethylene glycol)  She will take 2 tabs of Dulcolax in the early afternoon the day before her procedure followed by one 10 z bottle of Mag Citrate  She will then drink one 32 oz bottle of Gatorade  2-3 hours later, she will repeat taking another bottle of Magnesium Citrate and drinking Gatorade  Thank you

## 2021-06-15 NOTE — TELEPHONE ENCOUNTER
----- Message from [facility] sent at 6/12/2021 11:26 AM EDT -----  Regarding: Pre-Procedure Testing Question  Contact: [phone]  Sav Bower, I still have the bloody and mucus stools, but that hasn't gotten any worse than the pictures I sent to you and Dr Zo Marshall  After consulting with my Allergist, it is recommended that I do an alternative prep for my Colonoscopy, something that does not contain Polyethylene Glycol  I researched 2 alternatives that I should be able to tolerate: Yamini-Salax or Purg-Odan  It says to alert the ordering doctor of any food or medication allergies  I am allergic to Sulfa, Lidocaine, Polyethylene Glycol, Polysorbate and sulfite preservatives and I pass out from epinephrine  I don't see those ingredients listed in either of these preps  I am allergic to berries, berry flavoring and shellfish in the food category  If you or Dr Zo Marshall don't know what food allergies put me at risk, I can ask the pharmacists when I  the prescription  Also, if there is a prep sheet I need to go along with this new prep, you will need to mail that to me or advise otherwise   Thanks, Publmeli

## 2021-06-17 NOTE — TELEPHONE ENCOUNTER
Spoke with patient and she got the message and understands  She wants to check to make sure the sodium levels in the citrate are not too high as she has had an issue with this in the past  She will call us back if any issue

## 2021-06-28 DIAGNOSIS — K21.9 CHRONIC GERD: ICD-10-CM

## 2021-06-28 RX ORDER — ESOMEPRAZOLE MAGNESIUM 40 MG/1
40 CAPSULE, DELAYED RELEASE ORAL DAILY
Qty: 90 CAPSULE | Refills: 3 | Status: SHIPPED | OUTPATIENT
Start: 2021-06-28 | End: 2021-09-16 | Stop reason: SDUPTHER

## 2021-06-28 NOTE — TELEPHONE ENCOUNTER
----- Message from Hao Mi sent at 6/27/2021  9:59 AM EDT -----  Regarding: Non-Urgent Medical Question  Contact: [phone]  Hi Dr Dacia Britt,   Can you please submit a new prescription/refill for the Esomeprazole 40 mg for me to [facility]? Please make sure the RX is for 90 day supply and with 3 refills  Thank you!     Juventino Le

## 2021-07-14 ENCOUNTER — TELEPHONE (OUTPATIENT)
Dept: GASTROENTEROLOGY | Facility: HOSPITAL | Age: 61
End: 2021-07-14

## 2021-07-15 ENCOUNTER — ANESTHESIA EVENT (OUTPATIENT)
Dept: GASTROENTEROLOGY | Facility: HOSPITAL | Age: 61
End: 2021-07-15

## 2021-07-15 ENCOUNTER — HOSPITAL ENCOUNTER (OUTPATIENT)
Dept: GASTROENTEROLOGY | Facility: HOSPITAL | Age: 61
Setting detail: OUTPATIENT SURGERY
Discharge: HOME | End: 2021-07-15
Attending: INTERNAL MEDICINE | Admitting: INTERNAL MEDICINE
Payer: COMMERCIAL

## 2021-07-15 ENCOUNTER — ANESTHESIA (OUTPATIENT)
Dept: GASTROENTEROLOGY | Facility: HOSPITAL | Age: 61
End: 2021-07-15

## 2021-07-15 ENCOUNTER — TELEPHONE (OUTPATIENT)
Dept: GASTROENTEROLOGY | Facility: CLINIC | Age: 61
End: 2021-07-15

## 2021-07-15 VITALS
BODY MASS INDEX: 32.96 KG/M2 | SYSTOLIC BLOOD PRESSURE: 129 MMHG | RESPIRATION RATE: 20 BRPM | OXYGEN SATURATION: 97 % | DIASTOLIC BLOOD PRESSURE: 76 MMHG | HEIGHT: 67 IN | TEMPERATURE: 98.1 F | HEART RATE: 65 BPM | WEIGHT: 210 LBS

## 2021-07-15 DIAGNOSIS — K62.5 RECTAL BLEEDING: ICD-10-CM

## 2021-07-15 PROBLEM — E11.9 DIABETES MELLITUS TYPE 2, DIET-CONTROLLED (HCC): Status: ACTIVE | Noted: 2017-11-22

## 2021-07-15 LAB — GLUCOSE SERPL-MCNC: 95 MG/DL (ref 65–140)

## 2021-07-15 PROCEDURE — 88305 TISSUE EXAM BY PATHOLOGIST: CPT | Performed by: PATHOLOGY

## 2021-07-15 PROCEDURE — 45380 COLONOSCOPY AND BIOPSY: CPT | Performed by: INTERNAL MEDICINE

## 2021-07-15 PROCEDURE — 82948 REAGENT STRIP/BLOOD GLUCOSE: CPT

## 2021-07-15 RX ORDER — SODIUM CHLORIDE, SODIUM LACTATE, POTASSIUM CHLORIDE, CALCIUM CHLORIDE 600; 310; 30; 20 MG/100ML; MG/100ML; MG/100ML; MG/100ML
125 INJECTION, SOLUTION INTRAVENOUS CONTINUOUS
Status: DISCONTINUED | OUTPATIENT
Start: 2021-07-15 | End: 2021-07-19 | Stop reason: HOSPADM

## 2021-07-15 RX ORDER — MESALAMINE 4 G/60ML
4 ENEMA RECTAL
Qty: 30 ENEMA | Refills: 2 | Status: SHIPPED | OUTPATIENT
Start: 2021-07-15

## 2021-07-15 RX ORDER — PROPOFOL 10 MG/ML
INJECTION, EMULSION INTRAVENOUS AS NEEDED
Status: DISCONTINUED | OUTPATIENT
Start: 2021-07-15 | End: 2021-07-15

## 2021-07-15 RX ORDER — SODIUM CHLORIDE, SODIUM LACTATE, POTASSIUM CHLORIDE, CALCIUM CHLORIDE 600; 310; 30; 20 MG/100ML; MG/100ML; MG/100ML; MG/100ML
75 INJECTION, SOLUTION INTRAVENOUS CONTINUOUS
Status: DISCONTINUED | OUTPATIENT
Start: 2021-07-15 | End: 2021-07-19 | Stop reason: HOSPADM

## 2021-07-15 RX ORDER — LIDOCAINE HYDROCHLORIDE 10 MG/ML
INJECTION, SOLUTION EPIDURAL; INFILTRATION; INTRACAUDAL; PERINEURAL AS NEEDED
Status: DISCONTINUED | OUTPATIENT
Start: 2021-07-15 | End: 2021-07-15

## 2021-07-15 RX ORDER — MESALAMINE 4 G/60ML
4 ENEMA RECTAL
Qty: 30 ENEMA | Refills: 2 | Status: SHIPPED | OUTPATIENT
Start: 2021-07-15 | End: 2021-07-15 | Stop reason: SDUPTHER

## 2021-07-15 RX ADMIN — PROPOFOL 20 MG: 10 INJECTION, EMULSION INTRAVENOUS at 07:56

## 2021-07-15 RX ADMIN — PROPOFOL 30 MG: 10 INJECTION, EMULSION INTRAVENOUS at 07:55

## 2021-07-15 RX ADMIN — SODIUM CHLORIDE, SODIUM LACTATE, POTASSIUM CHLORIDE, AND CALCIUM CHLORIDE 75 ML/HR: .6; .31; .03; .02 INJECTION, SOLUTION INTRAVENOUS at 07:17

## 2021-07-15 RX ADMIN — LIDOCAINE HYDROCHLORIDE 20 MG: 10 INJECTION, SOLUTION EPIDURAL; INFILTRATION; INTRACAUDAL at 07:43

## 2021-07-15 RX ADMIN — PROPOFOL 40 MG: 10 INJECTION, EMULSION INTRAVENOUS at 07:47

## 2021-07-15 RX ADMIN — PROPOFOL 50 MG: 10 INJECTION, EMULSION INTRAVENOUS at 07:46

## 2021-07-15 RX ADMIN — PROPOFOL 30 MG: 10 INJECTION, EMULSION INTRAVENOUS at 07:51

## 2021-07-15 RX ADMIN — PROPOFOL 100 MG: 10 INJECTION, EMULSION INTRAVENOUS at 07:43

## 2021-07-15 RX ADMIN — PROPOFOL 40 MG: 10 INJECTION, EMULSION INTRAVENOUS at 07:49

## 2021-07-15 RX ADMIN — PROPOFOL 50 MG: 10 INJECTION, EMULSION INTRAVENOUS at 07:45

## 2021-07-15 RX ADMIN — PROPOFOL 30 MG: 10 INJECTION, EMULSION INTRAVENOUS at 07:53

## 2021-07-15 NOTE — H&P
History and Physical -  [facility]  Darby Kawasaki 63 y o  female MRN: [id number]                  HPI: Darby Kawasaki is a 63y o  year old female who presents for colonoscopy for evaluation of rectal bleeding      REVIEW OF SYSTEMS: Per the HPI, and otherwise unremarkable  Historical Information   Past Medical History:   Diagnosis Date    Diabetes mellitus ([address] )     GERD (gastroesophageal reflux disease)     Headache     Hyperlipidemia     Hypothyroidism     Neck pain     Tick bite      Past Surgical History:   Procedure Laterality Date    CHOLECYSTECTOMY      HAND SURGERY      TONSILLECTOMY       Social History   Social History     Substance and Sexual Activity   Alcohol Use No     Social History     Substance and Sexual Activity   Drug Use No     Social History     Tobacco Use   Smoking Status Former Smoker   Smokeless Tobacco Never Used     Family History   Problem Relation Age of Onset    Breast cancer Mother     Hyperlipidemia Mother     Diabetes Father     Hypertension Father        Meds/Allergies     (Not in a hospital admission)      Allergies   Allergen Reactions    Other Anaphylaxis     All berries causes throat swelling    Polyethylene Glycol Anaphylaxis    Shellfish-Derived Products - Food Allergy Anaphylaxis    Clindamycin      Chest pain    Epinephrine      Other reaction(s): dizzy, near syncope    Lidocaine Dizziness and Syncope     Patient has had syncopal episodes with local anesthesia   Polysorb Hydrate [Sorbitan] Itching    Cefprozil Rash    Sodium Chloride Rash     Pt states she had reaction to salty foods but not actually the salt    Sulfa Antibiotics Rash       Objective     Blood pressure 142/78, pulse 82, temperature 97 8 °F (36 6 °C), resp  rate 18, height 5' 7" (1 702 m), weight 95 3 kg (210 lb), SpO2 97 %        PHYSICAL EXAM    /78   Pulse 82   Temp 97 8 °F (36 6 °C)   Resp 18   Ht 5' 7" (1 702 m)   Wt 95 3 kg (210 lb) SpO2 97%   BMI 32 89 kg/m²       Gen: NAD  CV: RRR  CHEST: Clear  ABD: soft, NT/ND  EXT: no edema      ASSESSMENT/PLAN:  This is a 63y o  year old female here for colonoscopy, and she is stable and optimized for her procedure

## 2021-07-15 NOTE — TELEPHONE ENCOUNTER
We will need to give her a hydrocortisone enema instead called a cortenema  I sent this to her pharmacy  She should take this every night before bed for 4 weeks

## 2021-07-15 NOTE — TELEPHONE ENCOUNTER
[facility] called and said Dr Elene Soulier sent this medication to the wrong pharmacy and they need it resent to to [facility] in [address]  Thanks

## 2021-07-15 NOTE — ANESTHESIA POSTPROCEDURE EVALUATION
Post-Op Assessment Note    CV Status:  Stable  Pain Score: 0    Pain management: adequate     Mental Status:  Sleepy and arousable   Hydration Status:  Stable   PONV Controlled:  None   Airway Patency:  Patent and adequate      Post Op Vitals Reviewed: Yes      Staff: CRNA         No complications documented      BP   111/65   Temp   98 1   Pulse  71   Resp   16   SpO2   95

## 2021-07-15 NOTE — ANESTHESIA PREPROCEDURE EVALUATION
Procedure:  COLONOSCOPY    Relevant Problems   ENDO   (+) Adult hypothyroidism      GI/HEPATIC   (+) Chronic GERD      Obesity  Hyperlipidemia  Mild allergy-induced asthma    Physical Exam    Airway    Mallampati score: II  TM Distance: >3 FB  Neck ROM: full     Dental   Comment: Denies loose teeth,     Cardiovascular  Cardiovascular exam normal    Pulmonary  Pulmonary exam normal     Other Findings  Portions of exam deferred due to low yield and/or unknown COVID status      Anesthesia Plan  ASA Score- 2     Anesthesia Type- IV sedation with anesthesia with ASA Monitors  Additional Monitors:   Airway Plan:           Plan Factors-Exercise tolerance (METS): >4 METS  Chart reviewed  Existing labs reviewed  Patient summary reviewed  Patient is not a current smoker  Induction- intravenous  Postoperative Plan-     Informed Consent- Anesthetic plan and risks discussed with patient  I personally reviewed this patient with the CRNA  Discussed and agreed on the Anesthesia Plan with the CRNA  Devora Gastelum

## 2021-07-15 NOTE — TELEPHONE ENCOUNTER
Jeffy Poole pt-  Patient L/M  Nadja Wan She was prescribed Mesalamine 4 g she is allergic to sulfa which is an ingredient     Please phone [phone] to advise

## 2021-07-16 ENCOUNTER — TELEPHONE (OUTPATIENT)
Dept: GASTROENTEROLOGY | Facility: CLINIC | Age: 61
End: 2021-07-16

## 2021-07-16 DIAGNOSIS — K51.219 ULCERATIVE PROCTITIS WITH COMPLICATION (HCC): Primary | ICD-10-CM

## 2021-07-16 NOTE — TELEPHONE ENCOUNTER
Spoke with patient again  She again states the pharmacy has a suppository version of the hydrocortisone enema  Patient also questioned why her polyp was not retreive during her scope and sent for testing  Patient states "This information is vague and nonspecific", "He must have me confused with someone else"  Patient is requesting a call back from you, "non urgent"

## 2021-07-16 NOTE — TELEPHONE ENCOUNTER
----- Message from Lucia Mi sent at 7/15/2021  7:07 PM EDT -----  Regarding: RE: Prescription Question  Contact: [phone]  I spoke with the pharmacist at [facility] and the Cotenema Hydrocortizone enema also comes in a suppository form and she thinks my insurance might cover it but she needs a prescription to put through to be certain  I am asking you to submit a new prescription to [facility] in [place] for Cotenema suppository and I will use that if my insurance allows  If not, I can still  the enema  Sorry for the inconvenience, but the suppository would be so much easier  Thank you   Yulisa Herrera

## 2021-07-16 NOTE — TELEPHONE ENCOUNTER
JOSUÉI: Spoke with patient  Patient was requesting a suppository version of her hydrocortisone enema  Spoke with Dr Donovan reiterated to patient there is no equivalent dose to treat ulcerative proctitis to the hydrocortisone enema  Patient was very agitated on the phone  She again said she and Dr Donovan spoke about a suppository option [place]  Reminded patient she is allergic to sulfa and cannot have this enema  Patient again was agitated and states "If I go to this pharmacy and the pharmacist says something different I'll be sending another message"  Reassured patient, and reiterated this is the best choice for her  Patient also stated he is allergic to something in hydrocortisone, but would try this and see if she has a reaction

## 2021-07-16 NOTE — TELEPHONE ENCOUNTER
Sumit Farris / [place] patient - Patient called about the medication change as patient was allergic  To one of the ingredients in the med ordered  Patient would like a new script for a suppository for the hydrocortisone (CORTENEMA) 100 mg/60 mL enema [[id number]]   Sent to the [facility] in [place] @ [phone] today if possible    Thx

## 2021-07-16 NOTE — TELEPHONE ENCOUNTER
Left a voicemaio advising patient the cor   Was sent over to the [facility] in [place] confirmation yesterday at 3:27 pm [facility] any questions please contact the office

## 2021-07-16 NOTE — TELEPHONE ENCOUNTER
Kiran Flores pt-  Patient phoned again     She needs to know the status of the hydrocortisone suppository  Devora Gastelum She would like to start using the suppositories [facility] [phone]  Please phone [phone] to advise

## 2021-07-19 ENCOUNTER — TELEPHONE (OUTPATIENT)
Dept: GASTROENTEROLOGY | Facility: CLINIC | Age: 61
End: 2021-07-19

## 2021-07-19 NOTE — TELEPHONE ENCOUNTER
I spoke to her and addressed all of her my chart questions and gave her reassurance regarding the David enema

## 2021-07-20 ENCOUNTER — TELEPHONE (OUTPATIENT)
Dept: GASTROENTEROLOGY | Facility: CLINIC | Age: 61
End: 2021-07-20

## 2021-07-20 NOTE — TELEPHONE ENCOUNTER
----- Message from Chace Mcarthur MD sent at 7/19/2021  9:45 AM EDT -----  Please tell her that the biopsy shows colitis as expected

## 2021-07-20 NOTE — TELEPHONE ENCOUNTER
The biopsy simply gives multiple possible causes of the inflammation seen during her colonoscopy  Dr Ariella Cantu feels this is consistent with ulcerative colitis which is a type of inflammatory bowel disease

## 2021-07-20 NOTE — TELEPHONE ENCOUNTER
Called pt and advised  Pt wants clarification on the comment in mychart stating: The patient is noted to have diverticulum in the descending and sigmoid colon  The biopsy features raise a differential diagnosis of diverticular-associated colitis, drug-induced colitis and inflammatory bowel disease  Suggest clinical correlation and follow-up as indicated     Routing to pa  Thank you

## 2021-07-23 DIAGNOSIS — K51.219 ULCERATIVE PROCTITIS WITH COMPLICATION (HCC): Primary | ICD-10-CM

## 2021-07-23 RX ORDER — PREDNISONE 10 MG/1
TABLET ORAL
Qty: 70 TABLET | Refills: 0 | Status: SHIPPED | OUTPATIENT
Start: 2021-07-23 | End: 2021-08-18

## 2021-07-23 NOTE — TELEPHONE ENCOUNTER
FYI: Spoke with patient  Patient only wanted to see Dr Donovan first OV was September 17th  Patient understands sooner visit should be done with a PA  She accepted an appointment with our PA  Patient continued to voice multiple complaints about having been on prednisone for her reaction to the COVID vaccine, and agreed to a taper

## 2021-07-23 NOTE — TELEPHONE ENCOUNTER
There are no alternative enemas she can use unfortunately  We would have to use oral prednisone for her instead  I would recommend doing a taper starting at 40 mg daily for 1 week, then 30 mg daily for 1 week, 20 mg daily for 1 week, then 10 mg daily for 1 week  Make sure she also has a follow up appt with Vicki JACOBS  In the next 4-6 weeks

## 2021-07-23 NOTE — TELEPHONE ENCOUNTER
----- Message from Estelle Pederson  Shmuel sent at 7/22/2021  6:22 PM EDT -----  Regarding: Non-Urgent Medical Question  Contact: [phone]  Dear Dr Carolina Lechuga, I am having an allergic reaction to the Cortenema Enema  There's an ingredient in it that I am allergic to, although I usually have some tolerance  It's the Polysorbate 80  I am also allergic to Sulfur, so I can't take the Mesalamine enema either  Is there anything else I can take? My face and cheeks are all swollen and I've been nauseous all day  I was going to try and continue to take it, but I fear that my symptoms will only continue to get worse leading to trouble swallowing  This has happened in the past   What can I take? I am still bleeding quite a bit with or without stools and still have mucus discharge as well  UGH!   Katrin Last

## 2021-08-16 RX ORDER — ALBUTEROL SULFATE 90 UG/1
AEROSOL, METERED RESPIRATORY (INHALATION)
COMMUNITY

## 2021-08-16 RX ORDER — TRIAMCINOLONE ACETONIDE 1 MG/G
CREAM TOPICAL
COMMUNITY

## 2021-08-16 RX ORDER — HYDROXYZINE HYDROCHLORIDE 25 MG/1
TABLET, FILM COATED ORAL
COMMUNITY

## 2021-08-16 RX ORDER — LEVOTHYROXINE SODIUM 75 MCG
TABLET ORAL
COMMUNITY
Start: 2021-07-12

## 2021-08-16 RX ORDER — TRIAMCINOLONE ACETONIDE 1 MG/G
CREAM TOPICAL
COMMUNITY
Start: 2021-05-21

## 2021-08-16 RX ORDER — AZITHROMYCIN 250 MG/1
TABLET, FILM COATED ORAL
COMMUNITY
Start: 2021-07-06

## 2021-08-17 ENCOUNTER — OFFICE VISIT (OUTPATIENT)
Dept: GASTROENTEROLOGY | Facility: CLINIC | Age: 61
End: 2021-08-17
Payer: COMMERCIAL

## 2021-08-17 VITALS
HEIGHT: 67 IN | WEIGHT: 219.2 LBS | HEART RATE: 70 BPM | DIASTOLIC BLOOD PRESSURE: 84 MMHG | SYSTOLIC BLOOD PRESSURE: 138 MMHG | BODY MASS INDEX: 34.4 KG/M2

## 2021-08-17 DIAGNOSIS — K51.20 UC (ULCERATIVE COLITIS CONFINED TO RECTUM) (HCC): Primary | ICD-10-CM

## 2021-08-17 PROCEDURE — 99213 OFFICE O/P EST LOW 20 MIN: CPT | Performed by: PHYSICIAN ASSISTANT

## 2021-08-17 RX ORDER — AMOXICILLIN 500 MG/1
CAPSULE ORAL
COMMUNITY
Start: 2021-08-13

## 2021-08-17 NOTE — PROGRESS NOTES
[facility]  Crow Farah 63 y o  female MRN: [id number]       CC: Follow-up after colonoscopy for rectal bleeding    HPI: Hyacinth Fitch is a 61-year-old female with history of type 2 diabetes, hypothyroidism and hyperlipidemia  Patient is known to us for history of chronic GERD, and new diagnosis of ulcerative proctitis  She was last seen by me in May for symptoms of rectal bleeding, which were new  She reports that she was also having some right-sided discomfort  On colonoscopy, it was discovered that she had proctitis with inflammation on biopsy  She reports that she did not take the Rowasa enema as the patient is allergic to 1 of the ingredients, sorbitol  She was prescribed prednisone instead, the patient reports that her rectal bleeding and pencil thin stool have completely resolved  She is doing very well  She reports that her father also suffered with colitis, but does not know specific details  Her last EGD was in January 2020 revealing irregular Z-line and mild nonerosive gastritis  Biopsies were negative for Rehman's esophagus at that time  Review of Systems:    CONSTITUTIONAL: Denies any fever, chills, or rigors  Good appetite, and no recent weight loss  HEENT: No earache or tinnitus  Denies hearing loss or visual disturbances  CARDIOVASCULAR: No chest pain or palpitations  RESPIRATORY: Denies any cough, hemoptysis, shortness of breath or dyspnea on exertion  GASTROINTESTINAL: As noted in the History of Present Illness  GENITOURINARY: No problems with urination  Denies any hematuria or dysuria  NEUROLOGIC: No dizziness or vertigo, denies headaches  MUSCULOSKELETAL: Denies any muscle or joint pain  SKIN: Denies skin rashes or itching  ENDOCRINE: Denies excessive thirst  Denies intolerance to heat or cold  PSYCHOSOCIAL: Denies depression or anxiety  Denies any recent memory loss         Current Outpatient Medications   Medication Sig Dispense Refill    albuterol (PROVENTIL HFA,VENTOLIN HFA) 90 mcg/act inhaler Inhale 2 puffs every 6 (six) hours as needed      albuterol (PROVENTIL HFA,VENTOLIN HFA) 90 mcg/act inhaler albuterol sulfate HFA 90 mcg/actuation aerosol inhaler      amoxicillin (AMOXIL) 500 mg capsule TAKE 1 CAPSULE BY MOUTH EVERY 6 HOURS UNTIL DONE      ascorbic acid (VITAMIN C) 500 mg tablet Take 500 mg by mouth daily      atorvastatin (LIPITOR) 20 mg tablet Take 20 mg by mouth daily      benzoyl peroxide 5 % external liquid benzoyl peroxide 5 % topical cleanser   WASH AFFECTED AREA DAILY DURING SHOWER      Calcium Carbonate (CALCIUM 500 PO) Take by mouth daily Calcium- Mag- zinc      Calcium Carbonate 500 MG CHEW 1 tablet      Cholecalciferol 25 MCG (1000 UT) tablet 1 tablet 2 (two) times a day      Cholecalciferol 50 MCG (2000 UT) CAPS 1 tablet daily       clindamycin (CLEOCIN T) 1 % external solution clindamycin phosphate 1 % topical solution   APPLY TO AFFECTED AREA IN GROIN TWICE A DAY      diphenhydrAMINE (BENADRYL) 25 mg capsule Take 25 mg by mouth every 6 (six) hours as needed for itching      esomeprazole (NexIUM) 40 MG capsule Take 1 capsule (40 mg total) by mouth daily 90 capsule 3    Ferrous Sulfate Dried 200 (65 Fe) MG TABS 1 tablet daily       Levothyroxine Sodium 75 MCG CAPS Take 75 mcg by mouth       loratadine (CLARITIN) 10 mg tablet Take 10 mg by mouth daily      Magnesium 400 MG TABS Take by mouth daily      meloxicam (MOBIC) 15 mg tablet as needed      mometasone (NASONEX) 50 mcg/act nasal spray 2 sprays into each nostril      nitroglycerin (NITROSTAT) 0 3 mg SL tablet Place 0 3 mg under the tongue      predniSONE 10 mg tablet Take 4 tablets (40 mg total) by mouth daily for 7 days, THEN 3 tablets (30 mg total) daily for 7 days, THEN 2 tablets (20 mg total) daily for 7 days, THEN 1 tablet (10 mg total) daily for 7 days   70 tablet 0    Synthroid 75 MCG tablet       triamcinolone (KENALOG) 0 1 % cream       triamcinolone (KENALOG) 0 1 % cream triamcinolone acetonide 0 1 % topical cream      Zinc Sulfate (ZINC 15 PO) Take by mouth daily      azithromycin (ZITHROMAX) 250 mg tablet TAKE 2 TABLETS BY MOUTH TODAY, THEN TAKE 1 TABLET DAILY FOR 4 DAYS AS DIRECTED (Patient not taking: Reported on 8/17/2021)      dicyclomine (BENTYL) 20 mg tablet Take 1 tablet (20 mg total) by mouth 4 (four) times a day as needed (abdominal pain/cramping) 90 tablet 1    hydrocortisone (CORTENEMA) 100 mg/60 mL enema Insert 60 mL (100 mg total) into the rectum daily at bedtime for 28 days 28 enema 0    hydrocortisone 2 5 % cream  (Patient not taking: Reported on 8/17/2021)      hydrOXYzine HCL (ATARAX) 25 mg tablet hydroxyzine HCl 25 mg tablet   1 TABLET EVERY 6 HRS FOR 30 DAYS (Patient not taking: Reported on 8/17/2021)      hyoscyamine (ANASPAZ,LEVSIN) 0 125 MG tablet Take 1 tablet (0 125 mg total) by mouth every 4 (four) hours as needed for cramping (Patient not taking: Reported on 8/17/2021) 30 tablet 0    mesalamine (ROWASA) 4 g Insert 60 mL (4 g total) into the rectum daily at bedtime (Patient not taking: Reported on 8/17/2021) 30 enema 2    tacrolimus (PROTOPIC) 0 1 % ointment  (Patient not taking: Reported on 8/17/2021)       No current facility-administered medications for this visit       Past Medical History:   Diagnosis Date    Diabetes mellitus ([address] )     GERD (gastroesophageal reflux disease)     Headache     Hyperlipidemia     Hypothyroidism     Neck pain     Tick bite      Past Surgical History:   Procedure Laterality Date    CHOLECYSTECTOMY      HAND SURGERY      TONSILLECTOMY       Social History     Socioeconomic History    Marital status: /Civil Union     Spouse name: None    Number of children: None    Years of education: None    Highest education level: None   Occupational History    None   Tobacco Use    Smoking status: Former Smoker    Smokeless tobacco: Never Used   Vaping Use    Vaping Use: Never used Substance and Sexual Activity    Alcohol use: No    Drug use: No    Sexual activity: Yes     Partners: Male   Other Topics Concern    None   Social History Narrative    None     Social Determinants of Health     Financial Resource Strain:     Difficulty of Paying Living Expenses:    Food Insecurity:     Worried About Running Out of Food in the Last Year:     [address] in the Last Year:    Transportation Needs:     Lack of Transportation (Medical):  Lack of Transportation (Non-Medical):    Physical Activity:     Days of Exercise per Week:     Minutes of Exercise per Session:    Stress:     Feeling of Stress :    Social Connections:     Frequency of Communication with Friends and Family:     Frequency of Social Gatherings with Friends and Family:     Attends [employer] Services:     Active Member of Clubs or Organizations:     Attends Club or Organization Meetings:     Marital Status:    Intimate Partner Violence:     Fear of Current or Ex-Partner:     Emotionally Abused:     Physically Abused:     Sexually Abused:      Family History   Problem Relation Age of Onset    Breast cancer Mother     Hyperlipidemia Mother     Diabetes Father     Hypertension Father             PHYSICAL EXAM:    Vitals:    08/17/21 1537   BP: 138/84   Pulse: 70   Weight: 99 4 kg (219 lb 3 2 oz)   Height: 5' 7" (1 702 m)     General Appearance:   Alert and oriented x 3   Cooperative, and in no respiratory distress   HEENT:   Normocephalic, atraumatic, anicteric      Neck:  Supple, symmetrical, trachea midline   Lungs:   Clear to auscultation bilaterally    Heart[de-identified]   Regular rate and rhythm   Abdomen:   Soft, non-tender, non-distended; normal bowel sounds; no masses, no organomegaly    Genitalia:   Deferred    Rectal:   Deferred    Extremities:  No cyanosis, clubbing or edema    Pulses:  2+ and symmetric all extremities    Skin:  Skin color, texture, turgor normal, no rashes or lesions    Lymph nodes:  No palpable cervical or supraclavicular lymphadenopathy        Lab Results:             Invalid input(s): LABALBU            Imaging Studies: I have personally reviewed pertinent imaging studies  Colonoscopy    Result Date: 7/15/2021  Impression: Ulcerative proctitis Left-sided diverticulosis Polyp status post cold snare polypectomy Internal hemorrhoid RECOMMENDATION: Repeat colonoscopy in 5 years due to a personal history of colon polyps  Await pathology High-fiber diet Rowasa enema course nightly for 8 weeks Mikal Valverde III, MD       ASSESSMENT and PLAN:      1)  Ulcerative proctitis -  Patient will let me know in the future if she were to have any diarrhea or rectal bleeding  I gave her the name of the suppositories we would use, Canasa in the event that she were to have recurrence of symptoms  Normally, prednisone is not the best treatment for distal disease and ulcerative colitis  She reports that she will not be able to take the enema secondary to potential reaction  She did try David enema, and did have facial redness and flushing  In the meantime, I told her to avoid NSAIDs and red meat  Follow up in 3 months

## 2021-08-18 DIAGNOSIS — K51.219 ULCERATIVE PROCTITIS WITH COMPLICATION (HCC): ICD-10-CM

## 2021-08-18 RX ORDER — PREDNISONE 10 MG/1
TABLET ORAL
Qty: 70 TABLET | Refills: 0 | Status: SHIPPED | OUTPATIENT
Start: 2021-08-18 | End: 2021-09-15

## 2021-08-24 ENCOUNTER — TELEPHONE (OUTPATIENT)
Dept: GASTROENTEROLOGY | Facility: CLINIC | Age: 61
End: 2021-08-24

## 2021-08-24 DIAGNOSIS — K51.219 ULCERATIVE PROCTITIS WITH COMPLICATION (HCC): Primary | ICD-10-CM

## 2021-08-24 NOTE — TELEPHONE ENCOUNTER
Pt stated she is not having a flare up and is being proactive that IF she gets a flare up she doesn't always want to be put on prednisone  She wanted the message to go to Dr Carolina Lechuga and get his thoughts because she is always being put in prednisone due to her allergies and it is not good to always be put on prednisone and she wanted the doctor's opinion on a viable treatment    Routing to pa

## 2021-08-24 NOTE — TELEPHONE ENCOUNTER
Routing to pa  Cannot find Pentasa suppository  Pt said allergeric to the Canasa  Please advise  Thank you

## 2021-08-24 NOTE — TELEPHONE ENCOUNTER
Then we will have to do inflammation testing in the stool and prednisone in the future as she and I discussed at the visit

## 2021-08-24 NOTE — TELEPHONE ENCOUNTER
----- Message from Cody Myles  [place] sent at 8/24/2021 11:03 AM EDT -----  Regarding: Non-Urgent Medical Question  Contact: [phone]  Hi Dr Selma Gavin, On my recent follow up visit from my colitis diagnosis and treatment with Prednisone (remember that I had an allergic reaction to the Cortenema enema - face swelling), Myra العلي mentioned that in the future, if I have a flare up, it could be treated with the Canasa Suppository  Due to my many allergies, she said to make sure I was not allergic to any of the ingredients  The Canasa Suppository contains sulfasalazine, which I cannot use due to my allergy to Sulfa  My sulfa allergy is rash head to toe  What would be my options for future flare ups? Here's a list of my allergies: Polyethylene Glycol, Polysorbate, Sulfa, Codeine, Shellfish, Berries, Epinephrine and Cefprozil  Also, you can just give me the name of the drug and I can research myself  I'm doing well now but I want to be proactive in case I have future flare ups   Thanks, Celena

## 2021-09-09 ENCOUNTER — TELEPHONE (OUTPATIENT)
Dept: GASTROENTEROLOGY | Facility: CLINIC | Age: 61
End: 2021-09-09

## 2021-09-09 NOTE — TELEPHONE ENCOUNTER
----- Message from Stanley Mi sent at 9/9/2021 10:34 AM EDT -----  Regarding: Non-Urgent Medical Question  Contact: [phone]  Dear Dr Abram Miles (and Dr Abram Miles only), For my upcoming scheduled appointment with you on Oct 7th for severe abdominal pain attacks, I'd also like to discuss other possible treatment drugs for any future flare ups of the Ulcerative Proctitis  On my recent follow up visit from this diagnosis and treatment with Prednisone (remember that I had an allergic reaction to the Cortenema enema - face swelling and I couldn't take the Mesalamine aka Rowasa due to Sulfasalizine  -  containing a sulfa derivative and I have severe sulfa allergy), Racheal Adrian mentioned that in the future, if I have a flare up, it could be treated with the Canasa Suppository  Due to my many allergies, she said to make sure I was not allergic to any of the ingredients  The Canasa Suppository also contains sulfasalazine, which I cannot use due to my allergy to Sulfa  My sulfa allergy is rash head to toe  What would be my options for future flare ups? Here's a list of my allergies: Polyethylene Glycol, Polysorbate, Sulfa, Codeine, Shellfish, Berries, Epinephrine and Cefprozil  Also, you can just give me the name of any alternative drugs and I can research myself  I want to be proactive in case I have future flare ups as it is not good to keep taking Prednisone   Thanks, Publmeli

## 2021-09-15 NOTE — TELEPHONE ENCOUNTER
Called pt and advised  Pt stated she specifically stated in her message that she wanted Dr Lucía Morales to be notified and for him to ask his colleagues about her situation  She said she didn't want Brandyn Valiente or anyone else to respond to her message , just Dr Lucía Morales  Pt stated this is the second time this happened and she will talk to Dr Lucía Morales about his when she comes in for her visit on the 7th even though she is coming in for a different issue    Pt stated she has been going to Dr Lucía Morales for years and this is not acceptable and she never had this problem before Dr Lucía Morales joined [employer] to pa

## 2021-09-15 NOTE — TELEPHONE ENCOUNTER
Please let patient know that all of the alternatives that we went over, are what are available for ulcerative proctitis  Therefore, in the future she will have to stick with prednisone if she cannot take the other medication  Thank you

## 2021-09-16 DIAGNOSIS — K21.9 CHRONIC GERD: ICD-10-CM

## 2021-09-16 RX ORDER — ESOMEPRAZOLE MAGNESIUM 40 MG/1
40 CAPSULE, DELAYED RELEASE ORAL DAILY
Qty: 90 CAPSULE | Refills: 3 | Status: SHIPPED | OUTPATIENT
Start: 2021-09-16

## 2021-09-16 NOTE — TELEPHONE ENCOUNTER
Pt request refill nexium  rx sent to provider -- pt requesting refill for 90 days x 3 refills  rx sent to provider

## 2021-09-17 NOTE — TELEPHONE ENCOUNTER
I spoke to her at length    Unfortunately she will not be able to use the topical medications given her reactions and reported allergies    I told her that she would be limited to prednisone or Uceris as possible treatments  I told her to pursue desensitization for her allergies but she recently saw 2 allergist and was told not to do this according to her

## 2021-10-07 ENCOUNTER — OFFICE VISIT (OUTPATIENT)
Dept: GASTROENTEROLOGY | Facility: CLINIC | Age: 61
End: 2021-10-07
Payer: COMMERCIAL

## 2021-10-07 VITALS
HEART RATE: 77 BPM | BODY MASS INDEX: 34.59 KG/M2 | SYSTOLIC BLOOD PRESSURE: 140 MMHG | WEIGHT: 220.4 LBS | DIASTOLIC BLOOD PRESSURE: 62 MMHG | HEIGHT: 67 IN

## 2021-10-07 DIAGNOSIS — R10.13 EPIGASTRIC ABDOMINAL PAIN: ICD-10-CM

## 2021-10-07 DIAGNOSIS — K21.9 CHRONIC GERD: Primary | ICD-10-CM

## 2021-10-07 PROCEDURE — 99214 OFFICE O/P EST MOD 30 MIN: CPT | Performed by: INTERNAL MEDICINE

## 2021-12-14 ENCOUNTER — TELEPHONE (OUTPATIENT)
Dept: GASTROENTEROLOGY | Facility: CLINIC | Age: 61
End: 2021-12-14

## 2021-12-20 ENCOUNTER — LAB (OUTPATIENT)
Dept: LAB | Facility: HOSPITAL | Age: 61
End: 2021-12-20
Payer: COMMERCIAL

## 2021-12-20 DIAGNOSIS — K51.219 ULCERATIVE PROCTITIS WITH COMPLICATION (HCC): ICD-10-CM

## 2021-12-20 PROCEDURE — 83993 ASSAY FOR CALPROTECTIN FECAL: CPT

## 2021-12-22 LAB — CALPROTECTIN STL-MCNT: 376 UG/G (ref 0–120)

## 2021-12-27 ENCOUNTER — TELEPHONE (OUTPATIENT)
Dept: GASTROENTEROLOGY | Facility: CLINIC | Age: 61
End: 2021-12-27

## 2021-12-29 ENCOUNTER — HOSPITAL ENCOUNTER (OUTPATIENT)
Dept: MAMMOGRAPHY | Facility: CLINIC | Age: 61
Discharge: HOME | End: 2021-12-29
Payer: COMMERCIAL

## 2021-12-29 VITALS — HEIGHT: 68 IN | WEIGHT: 216 LBS | BODY MASS INDEX: 32.74 KG/M2

## 2021-12-29 DIAGNOSIS — Z12.31 ENCOUNTER FOR SCREENING MAMMOGRAM FOR MALIGNANT NEOPLASM OF BREAST: ICD-10-CM

## 2021-12-29 PROCEDURE — 77067 SCR MAMMO BI INCL CAD: CPT

## 2021-12-29 PROCEDURE — 77063 BREAST TOMOSYNTHESIS BI: CPT

## 2022-09-07 DIAGNOSIS — K62.89 PROCTITIS: Primary | ICD-10-CM

## 2022-09-09 ENCOUNTER — APPOINTMENT (OUTPATIENT)
Dept: LAB | Facility: HOSPITAL | Age: 62
End: 2022-09-09
Payer: COMMERCIAL

## 2022-09-09 DIAGNOSIS — K62.89 PROCTITIS: ICD-10-CM

## 2022-09-09 PROCEDURE — 83993 ASSAY FOR CALPROTECTIN FECAL: CPT

## 2022-09-12 LAB — CALPROTECTIN STL-MCNT: 143 UG/G (ref 0–120)

## 2022-09-13 ENCOUNTER — TELEPHONE (OUTPATIENT)
Dept: GASTROENTEROLOGY | Facility: CLINIC | Age: 62
End: 2022-09-13

## 2022-09-13 DIAGNOSIS — K62.89 PROCTITIS: ICD-10-CM

## 2022-09-13 RX ORDER — PREDNISONE 10 MG/1
TABLET ORAL
Qty: 100 TABLET | Refills: 0 | Status: SHIPPED | OUTPATIENT
Start: 2022-09-13

## 2022-09-13 NOTE — TELEPHONE ENCOUNTER
----- Message from Jaimie Avila PA-C sent at 9/13/2022  7:54 AM EDT -----  Please let patient know that her fecal calprotectin is elevated, but not as elevated as it was last year when she had her last flare  Prednisone sent to the pharmacy  Thanks!

## 2023-09-28 NOTE — TELEPHONE ENCOUNTER
----- Message from Cody Myles  [place] sent at 7/18/2021 10:52 AM EDT -----  Regarding: Visit Follow-Up Question  Contact: [phone]  Dear Dr Selma Gavin, I have several questions regarding my recent colonoscopy and medication  You can call me at [phone] or answer them here  1  When you removed the small polyp, will it be sent to the lab to see if it's one that could have turned cancerous? My discharge papers show only one specimen sent to lab to determine cause of bleeding, which was from the colitis  So that is confusing to me  It is my understanding that all polyps should be tested  Please explain  2  Did you take a sample area of the inflamed colitis area to be tested? If so, what is that tested for? 3  After I alerted you to my allergy to an ingredient in the initial prescription Rowasa aka Mesalamine enema, you prescribed Cortenema Hydrocortizone enema but only for 4 weeks, when you initially prescribed the Rowasa for 8 weeks  How long should I be using the Cotenema and if only 4 weeks, why the shorted time? If I need to use longer, I will need another RX, as there are no renewals on this one  4  After some research on my end, the Cortenema Hydrocortizone enema is not a "cure" for the colitis and it's stated that it is often used in conjunction with other drugs to treat and cure the colitis  Why did you only prescribe this enema and nothing else? Will this effectively make my colitis go away without any other drug help?  Thanks, Publmeli
Dr Doris Wyatt spoke with patient on 7/19/21 see his documentation
For the polyp removed, unfortunately it was small and the polyp was not caught during the procedure so it was not be able to be sent to the lab to see what type of polyp it was  Typically all polyps are sent for testing but it does happen where the polyp is not caught after removal   2  The colitis was biopsied and this looks for findings that will tell us if it is ulcerative colitis or something infectious  3  The cortenema is a steroid where the mesalamine enema is a topical anti-inflammatory so we use the cortenema for a shorter period of time  It can put people into remission so we see how she does after using this for 4 weeks  4  The cortenema is not a cure for ulcerative colitis because it is an autoimmune disorder but it is a treatment and can put some patients into remission (in other words no symptoms), but we have to see how she does after 4 weeks of use to see if this is enough or if we will need to discuss other treatments options  If she has further questions, please make sure she has a follow up appt to discuss further 
Routing to pa  Thank you
146